# Patient Record
Sex: FEMALE | Race: BLACK OR AFRICAN AMERICAN | NOT HISPANIC OR LATINO | Employment: FULL TIME | URBAN - METROPOLITAN AREA
[De-identification: names, ages, dates, MRNs, and addresses within clinical notes are randomized per-mention and may not be internally consistent; named-entity substitution may affect disease eponyms.]

---

## 2023-06-19 ENCOUNTER — LAB (OUTPATIENT)
Dept: LAB | Facility: LAB | Age: 38
End: 2023-06-19
Payer: COMMERCIAL

## 2023-06-19 ENCOUNTER — OFFICE VISIT (OUTPATIENT)
Dept: PRIMARY CARE | Facility: CLINIC | Age: 38
End: 2023-06-19
Payer: COMMERCIAL

## 2023-06-19 VITALS
WEIGHT: 293 LBS | TEMPERATURE: 97.8 F | SYSTOLIC BLOOD PRESSURE: 138 MMHG | BODY MASS INDEX: 45.31 KG/M2 | HEART RATE: 74 BPM | DIASTOLIC BLOOD PRESSURE: 85 MMHG

## 2023-06-19 DIAGNOSIS — R73.09 ELEVATED HEMOGLOBIN A1C: ICD-10-CM

## 2023-06-19 DIAGNOSIS — M25.50 HYPERMOBILITY ARTHRALGIA: Primary | ICD-10-CM

## 2023-06-19 DIAGNOSIS — M25.50 HYPERMOBILITY ARTHRALGIA: ICD-10-CM

## 2023-06-19 LAB
ALANINE AMINOTRANSFERASE (SGPT) (U/L) IN SER/PLAS: 17 U/L (ref 7–45)
ALBUMIN (G/DL) IN SER/PLAS: 3.5 G/DL (ref 3.4–5)
ALKALINE PHOSPHATASE (U/L) IN SER/PLAS: 79 U/L (ref 33–110)
ANION GAP IN SER/PLAS: 9 MMOL/L (ref 10–20)
ASPARTATE AMINOTRANSFERASE (SGOT) (U/L) IN SER/PLAS: 25 U/L (ref 9–39)
BILIRUBIN TOTAL (MG/DL) IN SER/PLAS: 0.4 MG/DL (ref 0–1.2)
C REACTIVE PROTEIN (MG/L) IN SER/PLAS: 1.38 MG/DL
CALCIUM (MG/DL) IN SER/PLAS: 8.6 MG/DL (ref 8.6–10.3)
CARBON DIOXIDE, TOTAL (MMOL/L) IN SER/PLAS: 30 MMOL/L (ref 21–32)
CHLORIDE (MMOL/L) IN SER/PLAS: 102 MMOL/L (ref 98–107)
CREATININE (MG/DL) IN SER/PLAS: 0.82 MG/DL (ref 0.5–1.05)
ESTIMATED AVERAGE GLUCOSE FOR HBA1C: 117 MG/DL
GFR FEMALE: >90 ML/MIN/1.73M2
GLUCOSE (MG/DL) IN SER/PLAS: 93 MG/DL (ref 74–99)
HEMOGLOBIN A1C/HEMOGLOBIN TOTAL IN BLOOD: 5.7 %
POTASSIUM (MMOL/L) IN SER/PLAS: 4.7 MMOL/L (ref 3.5–5.3)
PROTEIN TOTAL: 7.2 G/DL (ref 6.4–8.2)
RHEUMATOID FACTOR (IU/ML) IN SERUM OR PLASMA: <10 IU/ML (ref 0–15)
SEDIMENTATION RATE, ERYTHROCYTE: 68 MM/H (ref 0–20)
SODIUM (MMOL/L) IN SER/PLAS: 136 MMOL/L (ref 136–145)
URATE (MG/DL) IN SER/PLAS: 4.4 MG/DL (ref 2.3–6.7)
UREA NITROGEN (MG/DL) IN SER/PLAS: 10 MG/DL (ref 6–23)

## 2023-06-19 PROCEDURE — 80053 COMPREHEN METABOLIC PANEL: CPT

## 2023-06-19 PROCEDURE — 86140 C-REACTIVE PROTEIN: CPT

## 2023-06-19 PROCEDURE — 1036F TOBACCO NON-USER: CPT | Performed by: FAMILY MEDICINE

## 2023-06-19 PROCEDURE — 99214 OFFICE O/P EST MOD 30 MIN: CPT | Performed by: FAMILY MEDICINE

## 2023-06-19 PROCEDURE — 86038 ANTINUCLEAR ANTIBODIES: CPT

## 2023-06-19 PROCEDURE — 84550 ASSAY OF BLOOD/URIC ACID: CPT

## 2023-06-19 PROCEDURE — 85652 RBC SED RATE AUTOMATED: CPT

## 2023-06-19 PROCEDURE — 36415 COLL VENOUS BLD VENIPUNCTURE: CPT

## 2023-06-19 PROCEDURE — 83036 HEMOGLOBIN GLYCOSYLATED A1C: CPT

## 2023-06-19 PROCEDURE — 86431 RHEUMATOID FACTOR QUANT: CPT

## 2023-06-19 RX ORDER — IBUPROFEN 600 MG/1
TABLET ORAL 3 TIMES DAILY PRN
COMMUNITY
Start: 2015-09-10

## 2023-06-19 RX ORDER — PROPRANOLOL HYDROCHLORIDE 20 MG/1
1 TABLET ORAL 3 TIMES DAILY
COMMUNITY
Start: 2022-09-07 | End: 2024-02-05 | Stop reason: SDUPTHER

## 2023-06-19 RX ORDER — RIMEGEPANT SULFATE 75 MG/75MG
TABLET, ORALLY DISINTEGRATING ORAL
COMMUNITY
Start: 2021-11-10 | End: 2023-11-16 | Stop reason: SDUPTHER

## 2023-06-19 RX ORDER — DULOXETIN HYDROCHLORIDE 30 MG/1
30 CAPSULE, DELAYED RELEASE ORAL DAILY
COMMUNITY
End: 2024-03-14 | Stop reason: WASHOUT

## 2023-06-19 RX ORDER — AMITRIPTYLINE HYDROCHLORIDE 10 MG/1
10 TABLET, FILM COATED ORAL NIGHTLY
COMMUNITY
End: 2024-02-12

## 2023-06-19 RX ORDER — AZELASTINE 1 MG/ML
SPRAY, METERED NASAL
COMMUNITY

## 2023-06-19 RX ORDER — GABAPENTIN 600 MG/1
600 TABLET ORAL NIGHTLY
COMMUNITY
End: 2023-11-16 | Stop reason: WASHOUT

## 2023-06-19 RX ORDER — ACETAMINOPHEN 500 MG
TABLET ORAL
COMMUNITY

## 2023-06-19 RX ORDER — TRIAMCINOLONE ACETONIDE 1 MG/G
1 OINTMENT TOPICAL 2 TIMES DAILY
COMMUNITY
Start: 2022-12-02

## 2023-06-19 RX ORDER — FERROUS SULFATE 325(65) MG
1 TABLET ORAL 2 TIMES DAILY
COMMUNITY
Start: 2018-01-06

## 2023-06-19 ASSESSMENT — PAIN SCALES - GENERAL: PAINLEVEL: 4

## 2023-06-19 NOTE — PROGRESS NOTES
Subjective   Patient ID: Miguelina Gloria is a 37 y.o. adult who presents for 6 month follow up.  HPI    The patient is here for:  1- her follow-up visit for her elevated A1C.  2-  Left Shoulder pain that started after leaning on her elbows one day and feeling something in her shoulder rolling forward.  Since then she has experienced some pain that never improved.  She was prescribed a medrol dose pack and cyclobenzaprine but they have not helped.   3-  She also has bilateral knees, ankles and wrist hypermotility.     A review of system was completed.  All systems were reviewed and were normal, except for the ones that are listed in the HPI.    Objective   Physical Exam  Constitutional:       Appearance: Normal appearance.   HENT:      Head: Normocephalic and atraumatic.      Right Ear: Tympanic membrane, ear canal and external ear normal.      Left Ear: Tympanic membrane, ear canal and external ear normal.      Nose: Nose normal.      Mouth/Throat:      Mouth: Mucous membranes are moist.      Pharynx: Oropharynx is clear.   Eyes:      Extraocular Movements: Extraocular movements intact.      Conjunctiva/sclera: Conjunctivae normal.      Pupils: Pupils are equal, round, and reactive to light.   Cardiovascular:      Rate and Rhythm: Normal rate and regular rhythm.      Pulses: Normal pulses.   Pulmonary:      Effort: Pulmonary effort is normal.      Breath sounds: Normal breath sounds.   Abdominal:      General: Abdomen is flat. Bowel sounds are normal.      Palpations: Abdomen is soft.   Musculoskeletal:         General: Normal range of motion.      Cervical back: Normal range of motion and neck supple.      Comments: Limited abduction and anterior or posterior mobilization of the left shoulder.  Associated mil-moderate pain with internal or external rotation.   Skin:     General: Skin is warm.   Neurological:      General: No focal deficit present.      Mental Status: Miguelina Gloria is alert and oriented to person,  place, and time. Mental status is at baseline.   Psychiatric:         Mood and Affect: Mood normal.         Behavior: Behavior normal.         Thought Content: Thought content normal.         Judgment: Judgment normal.         Assessment/Plan   Problem List Items Addressed This Visit          Musculoskeletal    Hypermobility arthralgia - Primary    Relevant Orders    Referral to Rheumatology    Uric Acid    C-Reactive Protein    Sedimentation Rate    BRANDIE with Reflex to [facility]    Rheumatoid Factor    Comprehensive Metabolic Panel       Hematologic    Elevated hemoglobin A1c    Relevant Orders    Hemoglobin A1C

## 2023-06-20 LAB — ANTI-NUCLEAR ANTIBODY (ANA): NEGATIVE

## 2023-10-15 PROBLEM — L50.0 ALLERGIC URTICARIA: Status: ACTIVE | Noted: 2023-10-15

## 2023-10-15 PROBLEM — K58.9 IRRITABLE BOWEL SYNDROME WITHOUT DIARRHEA: Status: ACTIVE | Noted: 2023-10-15

## 2023-10-15 PROBLEM — N92.0 SPOTTING BETWEEN MENSES: Status: ACTIVE | Noted: 2023-10-15

## 2023-10-15 PROBLEM — R60.0 BILATERAL LEG EDEMA: Status: ACTIVE | Noted: 2023-10-15

## 2023-10-15 PROBLEM — R09.A2 SENSATION OF LUMP IN THROAT: Status: ACTIVE | Noted: 2023-10-15

## 2023-10-15 PROBLEM — E61.1 IRON DEFICIENCY: Status: ACTIVE | Noted: 2022-09-26

## 2023-10-15 PROBLEM — R13.19 ESOPHAGEAL DYSPHAGIA: Status: ACTIVE | Noted: 2023-10-15

## 2023-10-15 PROBLEM — D24.1 FIBROADENOMA OF RIGHT BREAST: Status: ACTIVE | Noted: 2023-10-15

## 2023-10-15 PROBLEM — R63.5 ABNORMAL WEIGHT GAIN: Status: ACTIVE | Noted: 2023-10-15

## 2023-10-15 PROBLEM — G89.29 CHRONIC PAIN OF MULTIPLE SITES: Status: ACTIVE | Noted: 2023-10-15

## 2023-10-15 PROBLEM — R10.9 ABDOMINAL CRAMPING: Status: ACTIVE | Noted: 2023-10-15

## 2023-10-15 PROBLEM — G43.109 OCULAR MIGRAINE: Status: ACTIVE | Noted: 2023-10-15

## 2023-10-15 PROBLEM — S13.9XXA CERVICAL SPRAIN: Status: ACTIVE | Noted: 2023-10-15

## 2023-10-15 PROBLEM — G43.909 MIGRAINE HEADACHE: Status: ACTIVE | Noted: 2022-09-26

## 2023-10-15 PROBLEM — M25.50 ARTHRALGIA: Status: ACTIVE | Noted: 2022-09-26

## 2023-10-15 PROBLEM — R21 RASH: Status: ACTIVE | Noted: 2023-10-15

## 2023-10-15 PROBLEM — R42 DIZZINESS: Status: ACTIVE | Noted: 2023-10-15

## 2023-10-15 PROBLEM — N76.1 CHRONIC VAGINITIS: Status: ACTIVE | Noted: 2023-10-15

## 2023-10-15 PROBLEM — N89.8 VAGINAL DISCHARGE: Status: ACTIVE | Noted: 2023-10-15

## 2023-10-15 PROBLEM — B37.31 CANDIDIASIS OF VAGINA: Status: ACTIVE | Noted: 2023-10-15

## 2023-10-15 PROBLEM — H93.8X3 SENSATION OF FULLNESS IN BOTH EARS: Status: ACTIVE | Noted: 2023-10-15

## 2023-10-15 PROBLEM — H92.03 ACUTE OTALGIA, BILATERAL: Status: ACTIVE | Noted: 2023-10-15

## 2023-10-15 PROBLEM — R09.A2 GLOBUS SENSATION: Status: ACTIVE | Noted: 2023-10-15

## 2023-10-15 PROBLEM — B49 MYCOSIS: Status: ACTIVE | Noted: 2023-10-15

## 2023-10-15 PROBLEM — M25.562 BILATERAL KNEE PAIN: Status: ACTIVE | Noted: 2023-10-15

## 2023-10-15 PROBLEM — H69.93 DYSFUNCTION OF BOTH EUSTACHIAN TUBES: Status: ACTIVE | Noted: 2023-10-15

## 2023-10-15 PROBLEM — G44.86 CERVICOGENIC HEADACHE: Status: ACTIVE | Noted: 2023-10-15

## 2023-10-15 PROBLEM — H90.12 CONDUCTIVE HEARING LOSS OF LEFT EAR WITH UNRESTRICTED HEARING OF RIGHT EAR: Status: ACTIVE | Noted: 2023-10-15

## 2023-10-15 PROBLEM — R09.81 NASAL CONGESTION: Status: ACTIVE | Noted: 2023-10-15

## 2023-10-15 PROBLEM — K63.8219 SMALL INTESTINAL BACTERIAL OVERGROWTH: Status: ACTIVE | Noted: 2023-10-15

## 2023-10-15 PROBLEM — R00.2 HEART PALPITATIONS: Status: ACTIVE | Noted: 2023-10-15

## 2023-10-15 PROBLEM — Z86.59 HISTORY OF POSTTRAUMATIC STRESS DISORDER (PTSD): Status: ACTIVE | Noted: 2023-10-15

## 2023-10-15 PROBLEM — R51.9 FACIAL PAIN: Status: ACTIVE | Noted: 2023-10-15

## 2023-10-15 PROBLEM — R26.9 ABNORMALITY OF GAIT: Status: ACTIVE | Noted: 2023-10-15

## 2023-10-15 PROBLEM — M35.3 POLYMYALGIA (MULTI): Status: ACTIVE | Noted: 2023-10-15

## 2023-10-15 PROBLEM — N92.6 IRREGULAR BLEEDING: Status: ACTIVE | Noted: 2023-10-15

## 2023-10-15 PROBLEM — D64.9 ANEMIA: Status: ACTIVE | Noted: 2022-09-26

## 2023-10-15 PROBLEM — I45.9 CONDUCTION DISORDER OF THE HEART: Status: ACTIVE | Noted: 2022-09-26

## 2023-10-15 PROBLEM — R10.84 CHRONIC GENERALIZED ABDOMINAL PAIN: Status: ACTIVE | Noted: 2022-09-26

## 2023-10-15 PROBLEM — R10.9 CHRONIC ABDOMINAL PAIN: Status: ACTIVE | Noted: 2023-10-15

## 2023-10-15 PROBLEM — K29.00 GASTRITIS, ACUTE: Status: ACTIVE | Noted: 2023-10-15

## 2023-10-15 PROBLEM — N63.20 UNSPECIFIED LUMP IN THE LEFT BREAST, UNSPECIFIED QUADRANT: Status: ACTIVE | Noted: 2023-10-15

## 2023-10-15 PROBLEM — R52 CHRONIC PAIN OF MULTIPLE SITES: Status: ACTIVE | Noted: 2023-10-15

## 2023-10-15 PROBLEM — R13.10 DYSPHAGIA: Status: ACTIVE | Noted: 2023-10-15

## 2023-10-15 PROBLEM — G89.29 CHRONIC ABDOMINAL PAIN: Status: ACTIVE | Noted: 2023-10-15

## 2023-10-15 PROBLEM — R18.8 INTRA-ABDOMINAL FLUID COLLECTION: Status: ACTIVE | Noted: 2023-10-15

## 2023-10-15 PROBLEM — N94.9 GENITAL LESION, FEMALE: Status: ACTIVE | Noted: 2023-10-15

## 2023-10-15 PROBLEM — M35.7 HYPERMOBILITY SYNDROME: Status: ACTIVE | Noted: 2023-10-15

## 2023-10-15 PROBLEM — G90.A POTS (POSTURAL ORTHOSTATIC TACHYCARDIA SYNDROME): Status: ACTIVE | Noted: 2023-10-15

## 2023-10-15 PROBLEM — G89.29 CHRONIC GENERALIZED ABDOMINAL PAIN: Status: ACTIVE | Noted: 2023-10-15

## 2023-10-15 PROBLEM — M79.7 FIBROMYALGIA: Status: ACTIVE | Noted: 2023-10-15

## 2023-10-15 PROBLEM — K59.09 CHRONIC CONSTIPATION: Status: ACTIVE | Noted: 2023-10-15

## 2023-10-15 PROBLEM — R29.898 DECREASED ROM OF NECK: Status: ACTIVE | Noted: 2023-10-15

## 2023-10-15 PROBLEM — H92.09 PAIN OF EAR STRUCTURE: Status: ACTIVE | Noted: 2023-10-15

## 2023-10-15 PROBLEM — K76.9 LIVER LESION: Status: ACTIVE | Noted: 2023-10-15

## 2023-10-15 PROBLEM — R45.89 DEPRESSED MOOD: Status: ACTIVE | Noted: 2023-10-15

## 2023-10-15 PROBLEM — R92.8 ABNORMAL MAMMOGRAM: Status: ACTIVE | Noted: 2023-10-15

## 2023-10-15 PROBLEM — J31.0 CHRONIC RHINITIS: Status: ACTIVE | Noted: 2023-10-15

## 2023-10-15 PROBLEM — N92.3 SPOTTING BETWEEN MENSES: Status: ACTIVE | Noted: 2023-10-15

## 2023-10-15 PROBLEM — B96.89 BACTERIAL VAGINOSIS: Status: ACTIVE | Noted: 2023-10-15

## 2023-10-15 PROBLEM — R21 SKIN RASH: Status: ACTIVE | Noted: 2023-10-15

## 2023-10-15 PROBLEM — N76.0 BACTERIAL VAGINOSIS: Status: ACTIVE | Noted: 2023-10-15

## 2023-10-15 PROBLEM — D50.9 IRON DEFICIENCY ANEMIA: Status: ACTIVE | Noted: 2023-10-15

## 2023-10-15 PROBLEM — M25.50 POLYARTHRALGIA: Status: ACTIVE | Noted: 2023-10-15

## 2023-10-15 PROBLEM — R14.0 ABDOMINAL BLOATING: Status: ACTIVE | Noted: 2023-10-15

## 2023-10-15 PROBLEM — R10.2 PELVIC PAIN IN FEMALE: Status: ACTIVE | Noted: 2023-10-15

## 2023-10-15 PROBLEM — N92.3 VAGINAL BLEEDING BETWEEN PERIODS: Status: ACTIVE | Noted: 2023-10-15

## 2023-10-15 PROBLEM — N89.8 VAGINAL IRRITATION: Status: ACTIVE | Noted: 2023-10-15

## 2023-10-15 PROBLEM — K13.0 ANGULAR CHEILITIS: Status: ACTIVE | Noted: 2023-10-15

## 2023-10-15 PROBLEM — M54.2 CERVICAL PAIN: Status: ACTIVE | Noted: 2023-10-15

## 2023-10-15 PROBLEM — Z91.49 HISTORY OF PSYCHOLOGICAL TRAUMA: Status: ACTIVE | Noted: 2023-10-15

## 2023-10-15 PROBLEM — G43.119 INTRACTABLE MIGRAINE WITH AURA WITHOUT STATUS MIGRAINOSUS: Status: ACTIVE | Noted: 2023-10-15

## 2023-10-15 PROBLEM — R55 SYNCOPE: Status: ACTIVE | Noted: 2023-10-15

## 2023-10-15 PROBLEM — R10.84 CHRONIC GENERALIZED ABDOMINAL PAIN: Status: ACTIVE | Noted: 2023-10-15

## 2023-10-15 PROBLEM — B37.31 VAGINAL YEAST INFECTION: Status: ACTIVE | Noted: 2023-10-15

## 2023-10-15 PROBLEM — G89.29 CHRONIC GENERALIZED ABDOMINAL PAIN: Status: ACTIVE | Noted: 2022-09-26

## 2023-10-15 PROBLEM — M25.561 BILATERAL KNEE PAIN: Status: ACTIVE | Noted: 2023-10-15

## 2023-10-15 PROBLEM — B37.9 YEAST INFECTION: Status: ACTIVE | Noted: 2023-10-15

## 2023-10-15 PROBLEM — L23.89 ALLERGIC CONTACT DERMATITIS DUE TO OTHER AGENTS: Status: ACTIVE | Noted: 2023-10-15

## 2023-10-15 RX ORDER — SELENIUM 50 MCG
1 TABLET ORAL DAILY
COMMUNITY

## 2023-10-15 RX ORDER — TRIAMCINOLONE ACETONIDE 55 UG/1
SPRAY, METERED NASAL
COMMUNITY
Start: 2021-03-22

## 2023-10-15 RX ORDER — FEXOFENADINE HCL AND PSEUDOEPHEDRINE HCI 180; 240 MG/1; MG/1
1 TABLET, EXTENDED RELEASE ORAL DAILY
COMMUNITY

## 2023-10-15 RX ORDER — KETOROLAC TROMETHAMINE 10 MG/1
10 TABLET, FILM COATED ORAL EVERY 6 HOURS
COMMUNITY
Start: 2023-02-03 | End: 2024-05-09 | Stop reason: ALTCHOICE

## 2023-10-15 RX ORDER — TOPIRAMATE 25 MG/1
25 TABLET ORAL EVERY MORNING
COMMUNITY
End: 2023-11-16 | Stop reason: WASHOUT

## 2023-10-15 RX ORDER — MAGNESIUM 250 MG
TABLET ORAL
COMMUNITY
Start: 2023-04-19

## 2023-10-15 RX ORDER — GABAPENTIN 100 MG/1
100 CAPSULE ORAL 3 TIMES DAILY
COMMUNITY
Start: 2022-11-11 | End: 2023-11-16 | Stop reason: WASHOUT

## 2023-10-15 RX ORDER — LINACLOTIDE 145 UG/1
145 CAPSULE, GELATIN COATED ORAL DAILY
COMMUNITY

## 2023-10-15 RX ORDER — ASCORBIC ACID, TOCOPHERYL ACID SUCCINATE, THIAMINE, RIBOFLAVIN, NIACINAMIDE, PYRIDOXINE, FOLIC ACID, COBALAMIN, BIOTIN, PANTOTHENIC ACID, ZINC, SELENIUM 100; 1.5; 1.7; 20; 50; 5; 2; 300; 10; 25; 30; 7 MG/1; MG/1; MG/1; MG/1; MG/1; MG/1; MG/1; UG/1; MG/1; MG/1; [IU]/1; UG/1
TABLET, COATED ORAL DAILY
COMMUNITY

## 2023-10-15 RX ORDER — FAMOTIDINE 20 MG/1
1 TABLET, FILM COATED ORAL 2 TIMES DAILY PRN
COMMUNITY
Start: 2023-02-10

## 2023-10-15 RX ORDER — DULOXETIN HYDROCHLORIDE 30 MG/1
60 CAPSULE, DELAYED RELEASE ORAL DAILY
COMMUNITY
Start: 2023-07-01 | End: 2024-05-02 | Stop reason: SDUPTHER

## 2023-10-15 RX ORDER — L.ACID,FERM,PLA,RHA/B.BIF,LONG 126 MG
1 TABLET, DELAYED AND EXTENDED RELEASE ORAL DAILY
COMMUNITY
Start: 2023-04-19

## 2023-10-17 ENCOUNTER — APPOINTMENT (OUTPATIENT)
Dept: OBSTETRICS AND GYNECOLOGY | Facility: CLINIC | Age: 38
End: 2023-10-17
Payer: COMMERCIAL

## 2023-10-26 ENCOUNTER — PHARMACY VISIT (OUTPATIENT)
Dept: PHARMACY | Facility: CLINIC | Age: 38
End: 2023-10-26
Payer: MEDICARE

## 2023-10-26 ENCOUNTER — SPECIALTY PHARMACY (OUTPATIENT)
Dept: PHARMACY | Facility: CLINIC | Age: 38
End: 2023-10-26

## 2023-11-14 ENCOUNTER — PHARMACY VISIT (OUTPATIENT)
Dept: PHARMACY | Facility: CLINIC | Age: 38
End: 2023-11-14
Payer: MEDICARE

## 2023-11-14 PROCEDURE — RXMED WILLOW AMBULATORY MEDICATION CHARGE

## 2023-11-16 ENCOUNTER — OFFICE VISIT (OUTPATIENT)
Dept: NEUROLOGY | Facility: CLINIC | Age: 38
End: 2023-11-16
Payer: COMMERCIAL

## 2023-11-16 VITALS
HEART RATE: 91 BPM | WEIGHT: 293 LBS | BODY MASS INDEX: 49.05 KG/M2 | DIASTOLIC BLOOD PRESSURE: 83 MMHG | SYSTOLIC BLOOD PRESSURE: 126 MMHG

## 2023-11-16 DIAGNOSIS — G43.119 INTRACTABLE MIGRAINE WITH AURA WITHOUT STATUS MIGRAINOSUS: Primary | ICD-10-CM

## 2023-11-16 DIAGNOSIS — G43.109 OCULAR MIGRAINE: ICD-10-CM

## 2023-11-16 PROCEDURE — 99214 OFFICE O/P EST MOD 30 MIN: CPT | Performed by: NURSE PRACTITIONER

## 2023-11-16 PROCEDURE — 1036F TOBACCO NON-USER: CPT | Performed by: NURSE PRACTITIONER

## 2023-11-16 RX ORDER — GALCANEZUMAB 120 MG/ML
120 INJECTION, SOLUTION SUBCUTANEOUS
Qty: 2 EACH | Refills: 3 | Status: SHIPPED | OUTPATIENT
Start: 2023-11-16 | End: 2024-02-12 | Stop reason: SDUPTHER

## 2023-11-16 ASSESSMENT — LIFESTYLE VARIABLES
HOW OFTEN DO YOU HAVE SIX OR MORE DRINKS ON ONE OCCASION: NEVER
HOW OFTEN DO YOU HAVE A DRINK CONTAINING ALCOHOL: NEVER

## 2023-11-16 ASSESSMENT — PATIENT HEALTH QUESTIONNAIRE - PHQ9
2. FEELING DOWN, DEPRESSED OR HOPELESS: MORE THAN HALF THE DAYS
10. IF YOU CHECKED OFF ANY PROBLEMS, HOW DIFFICULT HAVE THESE PROBLEMS MADE IT FOR YOU TO DO YOUR WORK, TAKE CARE OF THINGS AT HOME, OR GET ALONG WITH OTHER PEOPLE: VERY DIFFICULT
8. MOVING OR SPEAKING SO SLOWLY THAT OTHER PEOPLE COULD HAVE NOTICED. OR THE OPPOSITE, BEING SO FIGETY OR RESTLESS THAT YOU HAVE BEEN MOVING AROUND A LOT MORE THAN USUAL: NOT AT ALL
9. THOUGHTS THAT YOU WOULD BE BETTER OFF DEAD, OR OF HURTING YOURSELF: NOT AT ALL
5. POOR APPETITE OR OVEREATING: MORE THAN HALF THE DAYS
3. TROUBLE FALLING OR STAYING ASLEEP: SEVERAL DAYS
SUM OF ALL RESPONSES TO PHQ9 QUESTIONS 1 & 2: 4
4. FEELING TIRED OR HAVING LITTLE ENERGY: SEVERAL DAYS
SUM OF ALL RESPONSES TO PHQ QUESTIONS 1-9: 11
6. FEELING BAD ABOUT YOURSELF - OR THAT YOU ARE A FAILURE OR HAVE LET YOURSELF OR YOUR FAMILY DOWN: MORE THAN HALF THE DAYS
7. TROUBLE CONCENTRATING ON THINGS, SUCH AS READING THE NEWSPAPER OR WATCHING TELEVISION: SEVERAL DAYS
1. LITTLE INTEREST OR PLEASURE IN DOING THINGS: MORE THAN HALF THE DAYS

## 2023-11-16 ASSESSMENT — ANXIETY QUESTIONNAIRES
3. WORRYING TOO MUCH ABOUT DIFFERENT THINGS: SEVERAL DAYS
4. TROUBLE RELAXING: SEVERAL DAYS
2. NOT BEING ABLE TO STOP OR CONTROL WORRYING: SEVERAL DAYS
7. FEELING AFRAID AS IF SOMETHING AWFUL MIGHT HAPPEN: SEVERAL DAYS
5. BEING SO RESTLESS THAT IT IS HARD TO SIT STILL: NOT AT ALL
1. FEELING NERVOUS, ANXIOUS, OR ON EDGE: MORE THAN HALF THE DAYS
IF YOU CHECKED OFF ANY PROBLEMS ON THIS QUESTIONNAIRE, HOW DIFFICULT HAVE THESE PROBLEMS MADE IT FOR YOU TO DO YOUR WORK, TAKE CARE OF THINGS AT HOME, OR GET ALONG WITH OTHER PEOPLE: SOMEWHAT DIFFICULT
GAD7 TOTAL SCORE: 7
6. BECOMING EASILY ANNOYED OR IRRITABLE: SEVERAL DAYS

## 2023-11-16 ASSESSMENT — ENCOUNTER SYMPTOMS
OCCASIONAL FEELINGS OF UNSTEADINESS: 1
DEPRESSION: 1
LOSS OF SENSATION IN FEET: 0

## 2023-11-17 NOTE — PROGRESS NOTES
Patient being assessed today for follow-up of migraine.  She reports that she is continuing to have 6 on average of 3 migraine days per week.  Discussed different possible options to help with preventative treatment due to the frequency.  Would like to try her on Emgality monthly.  Sample provided as we wait for the insurance process to go through.  Patient has in the past tried and failed beta-blocker, SSRI, topiramate, gabapentin.  Discussed role of medicine,  importance of taking medications, potential risks, benefits, and precautions to be taken.  Reviewed sleep hygiene and dietary modifications.  Follow-up in 2 to 3 months.    This note was created with voice recognition software and was not corrected for typographical or grammatical errors

## 2023-12-16 ENCOUNTER — SPECIALTY PHARMACY (OUTPATIENT)
Dept: PHARMACY | Facility: CLINIC | Age: 38
End: 2023-12-16

## 2023-12-16 PROCEDURE — RXMED WILLOW AMBULATORY MEDICATION CHARGE

## 2023-12-20 ENCOUNTER — PHARMACY VISIT (OUTPATIENT)
Dept: PHARMACY | Facility: CLINIC | Age: 38
End: 2023-12-20
Payer: MEDICARE

## 2024-01-19 ENCOUNTER — SPECIALTY PHARMACY (OUTPATIENT)
Dept: PHARMACY | Facility: CLINIC | Age: 39
End: 2024-01-19

## 2024-01-19 NOTE — TELEPHONE ENCOUNTER
Pt most recently seen by Jessica ryan as new neurology provider. Please send script to her for refill if appropriate.

## 2024-01-22 DIAGNOSIS — M79.7 FIBROMYALGIA: ICD-10-CM

## 2024-01-22 RX ORDER — DULOXETIN HYDROCHLORIDE 30 MG/1
60 CAPSULE, DELAYED RELEASE ORAL DAILY
Qty: 180 CAPSULE | Refills: 3 | Status: SHIPPED | OUTPATIENT
Start: 2024-01-22 | End: 2024-03-14 | Stop reason: WASHOUT

## 2024-01-25 ENCOUNTER — SPECIALTY PHARMACY (OUTPATIENT)
Dept: PHARMACY | Facility: CLINIC | Age: 39
End: 2024-01-25

## 2024-02-05 DIAGNOSIS — G43.119 INTRACTABLE MIGRAINE WITH AURA WITHOUT STATUS MIGRAINOSUS: Primary | ICD-10-CM

## 2024-02-05 RX ORDER — PROPRANOLOL HYDROCHLORIDE 20 MG/1
20 TABLET ORAL 3 TIMES DAILY
Qty: 90 TABLET | Refills: 0 | Status: SHIPPED | OUTPATIENT
Start: 2024-02-05 | End: 2024-02-12 | Stop reason: SDUPTHER

## 2024-02-06 ENCOUNTER — SPECIALTY PHARMACY (OUTPATIENT)
Dept: PHARMACY | Facility: CLINIC | Age: 39
End: 2024-02-06

## 2024-02-06 PROCEDURE — RXMED WILLOW AMBULATORY MEDICATION CHARGE

## 2024-02-07 ENCOUNTER — PHARMACY VISIT (OUTPATIENT)
Dept: PHARMACY | Facility: CLINIC | Age: 39
End: 2024-02-07
Payer: MEDICARE

## 2024-02-08 ENCOUNTER — CLINICAL SUPPORT (OUTPATIENT)
Dept: AUDIOLOGY | Facility: CLINIC | Age: 39
End: 2024-02-08
Payer: COMMERCIAL

## 2024-02-08 ENCOUNTER — OFFICE VISIT (OUTPATIENT)
Dept: OTOLARYNGOLOGY | Facility: CLINIC | Age: 39
End: 2024-02-08
Payer: COMMERCIAL

## 2024-02-08 DIAGNOSIS — H90.12 CONDUCTIVE HEARING LOSS OF LEFT EAR WITH UNRESTRICTED HEARING OF RIGHT EAR: Primary | ICD-10-CM

## 2024-02-08 DIAGNOSIS — H90.72 MIXED CONDUCTIVE AND SENSORINEURAL HEARING LOSS OF LEFT EAR WITH UNRESTRICTED HEARING OF RIGHT EAR: Primary | ICD-10-CM

## 2024-02-08 PROCEDURE — 92557 COMPREHENSIVE HEARING TEST: CPT | Performed by: AUDIOLOGIST

## 2024-02-08 PROCEDURE — 1036F TOBACCO NON-USER: CPT | Performed by: OTOLARYNGOLOGY

## 2024-02-08 PROCEDURE — 99213 OFFICE O/P EST LOW 20 MIN: CPT | Performed by: OTOLARYNGOLOGY

## 2024-02-08 PROCEDURE — 92567 TYMPANOMETRY: CPT | Performed by: AUDIOLOGIST

## 2024-02-08 ASSESSMENT — ENCOUNTER SYMPTOMS: DEPRESSION: 1

## 2024-02-08 NOTE — PROGRESS NOTES
History of present illness:  Miguelina Gloria is a 38 y.o. adult presenting today for annual follow-up. Patient reports difficulty hearing the phone ring with associated crackling sound in the left ear. However this is unchanged from her last visit.    RECALL 09/22/2022  The patient is a 36-year-old female presenting in clinic for a follow-up visit and she is accompanied by someone. She reports to having issues with hearing rumbling noises.     RECALL 05/07/21:  This is the initial visit for this patient who is referred by my esteemed colleague Dr Robbie Chris ([place]) for evaluation of progressive left conductive hearing loss and possible otosclerosis in the left ear. The patient is not accompanied by anyone.   When asked about ear pain, hearing loss, discharge from ear, tinnitus, aural fullness or autophony in the affected ear, the patient admits to progressive left subjective hearing loss, intermittent dizziness with quick head movement.  The patient's better hearing ear subjectively is the right ear.  When asked about a significant past otological history including history of prior ear surgery, noise exposure, exposure to ototoxic drugs or agents, and/or family history of hearing loss, the patient admits to no significant history.    The patient's current medications, active allergies and list of medical problems were reviewed in the EHR and confirmed electronically there are as follows;  Allergies:   Allergies   Allergen Reactions    Bee Pollen Other    Kiwi Unknown    Pineapple Unknown    Silver Nitrate Hives, Itching and Swelling     Current list of medications:   Current Outpatient Medications   Medication Sig Dispense Refill    amitriptyline (Elavil) 10 mg tablet Take 1 tablet (10 mg) by mouth once daily at bedtime.      azelastine (Astelin) 137 mcg (0.1 %) nasal spray USE 1 SRPAY EACH NOSTRIL DAILY AS DIRECTED      calcium carbonate-vitamin D3 600 mg-20 mcg (800 unit) tablet Take by mouth.      calcium  carbonate/vitamin D3 (CALCIUM 600 WITH VITAMIN D3 ORAL) Calcium-Vitamin D 600-3.125 MG-MCG Oral Tablet  Refills: 0  Start: 19-Apr-2023      DULoxetine (Cymbalta) 30 mg DR capsule Take 1 capsule (30 mg) by mouth once daily.      DULoxetine (Cymbalta) 30 mg DR capsule Take 2 capsules (60 mg) by mouth once daily.      DULoxetine (Cymbalta) 30 mg DR capsule TAKE 2 CAPSULES BY MOUTH EVERY  capsule 3    famotidine (Pepcid) 20 mg tablet Take 1 tablet (20 mg) by mouth 2 times a day as needed.      ferrous fumarate/ascorbic acid (FERROUS FUMARATE-VITAMIN C ORAL) Take 1 tablet by mouth once daily.      ferrous sulfate 325 (65 Fe) MG tablet Take 1 tablet (325 mg) by mouth 2 times a day.      fexofenadine-pseudoephedrine (Allegra-D 24 Hour) 180-240 mg 24 hr tablet Take 1 tablet by mouth once daily.      FEXOFENADINE-PSEUDOEPHEDRINE ORAL Take 1 tablet by mouth once daily.      FIBER, CALCIUM POLYCARBOPHIL, ORAL Take 5 g by mouth once daily.      galcanezumab (Emgality Pen) 120 mg/mL auto-injector Inject 1 Syringe (120 mg) under the skin every 28 (twenty-eight) days. Loading dose of 240mg x 1 then 120mg subcutaneous every month thereafter 2 each 3    ibuprofen 600 mg tablet Take by mouth 3 times a day as needed.      ketorolac (Toradol) 10 mg tablet Take 1 tablet (10 mg) by mouth every 6 hours. With food      L.acid,ferm,nichelle,rha-B.bif,long (Controlled Delivery Probiotic) 126 mg (2 billion cell) tablet,delayed and ext.release Take 1 tablet by mouth once daily.      lactobacillus acidophilus capsule Take 1 capsule by mouth once daily.      Linzess 145 mcg capsule Take 1 capsule (145 mcg) by mouth once daily.      magnesium 250 mg tablet Magnesium 250 MG Oral Tablet  Refills: 0  Start: 19-Apr-2023      multivit-minerals/folic acid (CENTRUM ADULTS ORAL) Take 1 tablet by mouth once daily.      multivit-mins no.11-folic acid (Dialyvite 5000) 5 mg tablet Take by mouth once daily.      propranolol (Inderal) 20 mg tablet Take 1  tablet (20 mg) by mouth 3 times a day. 90 tablet 0    rimegepant (Nurtec ODT) 75 mg tablet,disintegrating Alow one (1) tablet to dissolve on or under the tongue once daily as needed for migraine. Do not take more than one (1) tablet in a 24 hour period. 16 tablet 0    triamcinolone (Kenalog) 0.1 % ointment Apply 1 Application topically 2 times a day.      triamcinolone (Nasacort) 55 mcg nasal inhaler Nasal Allergy 24 Hour 55 MCG/ACT Nasal Aerosol  Refills: 0       No current facility-administered medications for this visit.     Problem list:  Patient Active Problem List   Diagnosis    Hypermobility arthralgia    Elevated hemoglobin A1c    Abnormal mammogram    Abnormal weight gain    Abnormality of gait    Allergic contact dermatitis due to other agents    Allergic urticaria    Anemia    Angular cheilitis    Bilateral knee pain    Bilateral leg edema    Bacterial vaginosis    Candidiasis of vagina    Vaginal yeast infection    Cervical sprain    Cervicogenic headache    Abdominal bloating    Abdominal cramping    Acute otalgia, bilateral    Cervical pain    Chronic abdominal pain    Chronic generalized abdominal pain    Chronic pain of multiple sites    Facial pain    Fibromyalgia    Pain of ear structure    Sensation of fullness in both ears    Chronic rhinitis    Chronic vaginitis    Ocular migraine    Intractable migraine with aura without status migrainosus    Intra-abdominal fluid collection    Hypermobility syndrome    History of posttraumatic stress disorder (PTSD)    History of psychological trauma    Heart palpitations    Genital lesion, female    Gastritis, acute    Fibroadenoma of right breast    Dysfunction of both eustachian tubes    Dysphagia    Esophageal dysphagia    Conduction disorder of the heart    Conductive hearing loss of left ear with unrestricted hearing of right ear    Decreased ROM of neck    Depressed mood    Dizziness    Iron deficiency    Iron deficiency anemia    Irregular bleeding     Vaginal bleeding between periods    Spotting between menses    Chronic constipation    Polymyalgia (CMS/HCC)    POTS (postural orthostatic tachycardia syndrome)    Sensation of lump in throat    Globus sensation    Rash    Skin rash    Small intestinal bacterial overgrowth    Syncope    Unspecified lump in the left breast, unspecified quadrant    Vaginal discharge    Vaginal irritation    Mycosis    Yeast infection    Arthralgia    Polyarthralgia    Liver lesion    Irritable bowel syndrome without diarrhea    Migraine headache    Nasal congestion    Pelvic pain in female         Physical Examination:  CONSTITUTIONAL:  No acute distress  VOICE:  No hoarseness or other abnormality  RESPIRATION:  Breathing comfortably, no stridor  CV:  No clubbing/cyanosis/edema in hands  EYES:  EOM intact, sclera clear  NEURO:  Alert and oriented times 3, Cranial nerves II-XII grossly intact and symmetric bilaterally  HEAD AND FACE:  Symmetric facial features, no masses or lesions  RIGHT EAR:  Normal external ear and post auricular area, no visible lesions, external auditory canal patent, tympanic membrane intact, no retraction, no signs of mass, effusion, or infection within the middle ear  LEFT EAR: Normal external ear and post auricular area, no visible lesions, external auditory canal patent, tympanic membrane intact, no retraction, no signs of mass, effusion, or infection within the middle ear  NOSE:  Anterior rhinoscopy clear, no significant external deformity.  ORAL CAVITY/OROPHARYNX/LIPS:  normal lining, mobile tongue.  PHARYNGEAL WALLS: Moist mucosal lining, good palatal elevation  NECK/LYMPH:  No LAD, no thyroid masses, trachea midline  SKIN:  Neck and facial skin is without scar or injury  PSYCH:  Alert and oriented with appropriate mood and affect  Yang Test   Ac > BC on the left ear, 512 and 1024 Hz    Diagnostic testing:    Audiometry:  Audiometry testing done on 02/08/24 reveals:    On the right:normal hearing  sensitivity   On the left: Mild to borderline conductive hearing loss  Bilateral Type A tympanogram. Excellent speech discrimination.    I personally reviewed the available patient's external record and independently reviewed their audiometric testing [and] radiographic imaging through the appropriate viewing software as detailed in my note and agree with the detailed report.      Impression:  Left conductive hearing loss  Possibly left otosclerosis    Recommendation:  Her otologic condition is overall unchanged. She is presently not a surgical candidate. We can consider hearing amplification with hearing  aids. Follow-up in two years.  I am the closest ENT physician to her home.        Scribe Attestation  By signing my name below, IBeverley Scribe   attest that this documentation has been prepared under the direction and in the presence of Teodora Veliz MD.

## 2024-02-08 NOTE — PROGRESS NOTES
"AUDIOMETRIC EVALUATION       Name:  Miguelina Gloria  :  1985  Age:  38 y.o.  Date of Evaluation:  2024     HISTORY  Miguelina Gloria, a 38 year old woman, returns today for audiologic re-evaluation in conjunction with appointment with Teodora Veliz M.D. She has a history of left conductive hearing loss, possibly related to otosclerosis. Reports a decrease in left ear hearing since last evaluation in , she tried to listen to a phone conversation in her left ear and was unable to hear. She notes a intermittent \"thunderstom\"-like tinnitus mostly in the left ear. She experiences some dizziness.     She experiences some ear pain. Her mother has hearing loss. Recent ear infections, ear surgery, and loud noise exposure were denied.     PROCEDURE:  Otoscopic Evaluation:    RIGHT: Clear ear canal and tympanic membrane visualized.  LEFT:  Clear ear canal and tympanic membrane visualized.    Immittance: Tympanometry (226 Hz probe tone) and Stapedial Acoustic Reflexes Thresholds (ART)(Probe ear):  RIGHT: Normal middle ear pressure, mobility, and ear canal volume. Ipsilateral ART present 500-2000 Hz.  LEFT: Normal middle ear pressure, mobility, and ear canal volume. Ipsilateral ART absent 500-2000 Hz.    Pure Tone and Speech Audiometry:    Test Technique: Pure Tone Audiometry via insert earphones  Test Reliability: good    RIGHT: Normal hearing sensitivity 250-8000 Hz. Word Recognition score was excellent using recorded material (NU-6 10-word list ordered by difficulty).   LEFT:  Moderate mixed hearing loss rising to within normal limits. Word Recognition score was excellent using recorded material (NU-6 10-word list ordered by difficulty).     EVALUATION  See scanned Audiogram in \"Media\".    IMPRESSIONS:  Today's test results indicate stable hearing as compared to previous testing.  Normal hearing right ear, moderate mixed rising to within normal limits left ear. Previous hearing test showed conductive hearing " loss, today shows more sensorineural component at 500 Hz than in previous test (2022). Consider amplification if perception of hearing loss impacts daily life.    RECOMMENDATIONS:  Continue medical follow-up with physician.  Return for audiologic assessment in conjunction with otologic care or annually.   Consider amplification pending medical clearance. Check insurance benefit for hearing aid benefits and in-network providers. To schedule an appointment for a hearing aid consultation call [phone].  Communication strategies (utilizing visual cues/gestures; reducing background noise and distance from desired source; increasing light to assist with visual cues; use of clear speech).     PATIENT EDUCATION:   Discussed results and recommendations with Miguelina Gloria.  Questions were addressed and the patient was encouraged to contact our department ([phone]) should concerns arise.    SERGIO Wilson, CCC-A  Senior Clinical Audiologist    TIME: 2382-7508

## 2024-02-12 ENCOUNTER — TELEMEDICINE (OUTPATIENT)
Dept: NEUROLOGY | Facility: CLINIC | Age: 39
End: 2024-02-12
Payer: COMMERCIAL

## 2024-02-12 DIAGNOSIS — G43.119 INTRACTABLE MIGRAINE WITH AURA WITHOUT STATUS MIGRAINOSUS: Primary | ICD-10-CM

## 2024-02-12 DIAGNOSIS — G44.86 CERVICOGENIC HEADACHE: ICD-10-CM

## 2024-02-12 DIAGNOSIS — R45.89 OTHER SYMPTOMS AND SIGNS INVOLVING EMOTIONAL STATE: ICD-10-CM

## 2024-02-12 PROCEDURE — 99214 OFFICE O/P EST MOD 30 MIN: CPT | Performed by: NURSE PRACTITIONER

## 2024-02-12 PROCEDURE — 1036F TOBACCO NON-USER: CPT | Performed by: NURSE PRACTITIONER

## 2024-02-12 RX ORDER — AMITRIPTYLINE HYDROCHLORIDE 10 MG/1
10 TABLET, FILM COATED ORAL NIGHTLY
Qty: 90 TABLET | Refills: 2 | Status: SHIPPED | OUTPATIENT
Start: 2024-02-12

## 2024-02-12 RX ORDER — PROPRANOLOL HYDROCHLORIDE 20 MG/1
20 TABLET ORAL 3 TIMES DAILY
Qty: 90 TABLET | Refills: 5 | Status: SHIPPED | OUTPATIENT
Start: 2024-02-12

## 2024-02-12 RX ORDER — GALCANEZUMAB 120 MG/ML
120 INJECTION, SOLUTION SUBCUTANEOUS
Qty: 1 EACH | Refills: 5 | Status: SHIPPED | OUTPATIENT
Start: 2024-02-12

## 2024-03-08 ENCOUNTER — SPECIALTY PHARMACY (OUTPATIENT)
Dept: PHARMACY | Facility: CLINIC | Age: 39
End: 2024-03-08

## 2024-03-11 ENCOUNTER — APPOINTMENT (OUTPATIENT)
Dept: OTOLARYNGOLOGY | Facility: CLINIC | Age: 39
End: 2024-03-11
Payer: COMMERCIAL

## 2024-03-11 DIAGNOSIS — G43.119 INTRACTABLE MIGRAINE WITH AURA WITHOUT STATUS MIGRAINOSUS: ICD-10-CM

## 2024-03-12 PROCEDURE — RXMED WILLOW AMBULATORY MEDICATION CHARGE

## 2024-03-14 ENCOUNTER — OFFICE VISIT (OUTPATIENT)
Dept: PRIMARY CARE | Facility: CLINIC | Age: 39
End: 2024-03-14
Payer: COMMERCIAL

## 2024-03-14 ENCOUNTER — HOSPITAL ENCOUNTER (OUTPATIENT)
Dept: RADIOLOGY | Facility: CLINIC | Age: 39
Discharge: HOME | End: 2024-03-14
Payer: COMMERCIAL

## 2024-03-14 VITALS
DIASTOLIC BLOOD PRESSURE: 82 MMHG | SYSTOLIC BLOOD PRESSURE: 131 MMHG | WEIGHT: 293 LBS | HEART RATE: 96 BPM | TEMPERATURE: 98 F | BODY MASS INDEX: 51.7 KG/M2

## 2024-03-14 DIAGNOSIS — M54.9 UPPER BACK PAIN: ICD-10-CM

## 2024-03-14 DIAGNOSIS — M25.551 BILATERAL HIP PAIN: ICD-10-CM

## 2024-03-14 DIAGNOSIS — M54.50 MIDLINE LOW BACK PAIN, UNSPECIFIED CHRONICITY, UNSPECIFIED WHETHER SCIATICA PRESENT: ICD-10-CM

## 2024-03-14 DIAGNOSIS — M25.552 BILATERAL HIP PAIN: ICD-10-CM

## 2024-03-14 DIAGNOSIS — M54.9 UPPER BACK PAIN: Primary | ICD-10-CM

## 2024-03-14 PROCEDURE — 72110 X-RAY EXAM L-2 SPINE 4/>VWS: CPT

## 2024-03-14 PROCEDURE — 72070 X-RAY EXAM THORAC SPINE 2VWS: CPT

## 2024-03-14 PROCEDURE — 99214 OFFICE O/P EST MOD 30 MIN: CPT | Performed by: FAMILY MEDICINE

## 2024-03-14 PROCEDURE — 72110 X-RAY EXAM L-2 SPINE 4/>VWS: CPT | Performed by: RADIOLOGY

## 2024-03-14 PROCEDURE — 73522 X-RAY EXAM HIPS BI 3-4 VIEWS: CPT

## 2024-03-14 PROCEDURE — 73521 X-RAY EXAM HIPS BI 2 VIEWS: CPT | Mod: BILATERAL PROCEDURE | Performed by: RADIOLOGY

## 2024-03-14 PROCEDURE — 1036F TOBACCO NON-USER: CPT | Performed by: FAMILY MEDICINE

## 2024-03-14 RX ORDER — METHYLPREDNISOLONE 4 MG/1
TABLET ORAL
Qty: 21 TABLET | Refills: 0 | Status: SHIPPED | OUTPATIENT
Start: 2024-03-14 | End: 2024-03-21

## 2024-03-14 ASSESSMENT — ENCOUNTER SYMPTOMS: BACK PAIN: 1

## 2024-03-14 NOTE — PROGRESS NOTES
Subjective   Patient ID: Migueilna Gloria is a 38 y.o. adult who presents for Back Pain.  Back Pain    The patient is a 37 yo AA female with a history of obesity- BMI 51, ocular migraines, POTS, here for the management of a back pain that occurred after an accidental fall in November of 2023.  The patient states that she slipped in her bathtub and landed back moeller of her hips and her upper and lower back.  Since then, she has developed a pain that has worsen since then.  It is not responding to Tylenol.  NSAIDS cause heartburn.     A review of system was completed.  All systems were reviewed and were normal, except for the ones that are listed in the HPI.    Objective   Physical Exam  Constitutional:       Appearance: Normal appearance.   HENT:      Head: Normocephalic and atraumatic.      Right Ear: Tympanic membrane, ear canal and external ear normal.      Left Ear: Tympanic membrane, ear canal and external ear normal.      Nose: Nose normal.      Mouth/Throat:      Mouth: Mucous membranes are moist.      Pharynx: Oropharynx is clear.   Eyes:      Extraocular Movements: Extraocular movements intact.      Conjunctiva/sclera: Conjunctivae normal.      Pupils: Pupils are equal, round, and reactive to light.   Cardiovascular:      Rate and Rhythm: Normal rate and regular rhythm.      Pulses: Normal pulses.   Pulmonary:      Effort: Pulmonary effort is normal.      Breath sounds: Normal breath sounds.   Abdominal:      General: Abdomen is flat. Bowel sounds are normal.      Palpations: Abdomen is soft.   Musculoskeletal:         General: Normal range of motion.      Cervical back: Normal range of motion and neck supple.   Skin:     General: Skin is warm.   Neurological:      General: No focal deficit present.      Mental Status: Miguelina Gloria is alert and oriented to person, place, and time. Mental status is at baseline.   Psychiatric:         Mood and Affect: Mood normal.         Behavior: Behavior normal.          Thought Content: Thought content normal.         Judgment: Judgment normal.     Assessment/Plan   Problem List Items Addressed This Visit       Upper back pain - Primary     - Medrol dosepack started.   - Then  Tylenol.  NSAIDS cause heartburn.   -PT referral done.            Relevant Medications    methylPREDNISolone (Medrol Dospak) 4 mg tablets    Other Relevant Orders    XR thoracic spine 2 views    XR hips bilateral 3 or 4 VW w pelvis when performed    Referral to Physical Therapy    Midline low back pain     - Medrol dosepack started.   - Then  Tylenol.  NSAIDS cause heartburn.   -PT referral done.            Relevant Medications    methylPREDNISolone (Medrol Dospak) 4 mg tablets    Other Relevant Orders    XR lumbar spine 2-3 views    XR hips bilateral 3 or 4 VW w pelvis when performed    Referral to Physical Therapy     Other Visit Diagnoses       Bilateral hip pain        Relevant Orders    XR hips bilateral 3 or 4 VW w pelvis when performed    Referral to Physical Therapy         Patient to return to office in 2- 4 weeks.

## 2024-03-22 ENCOUNTER — SPECIALTY PHARMACY (OUTPATIENT)
Dept: PHARMACY | Facility: CLINIC | Age: 39
End: 2024-03-22

## 2024-03-25 ENCOUNTER — SPECIALTY PHARMACY (OUTPATIENT)
Dept: PHARMACY | Facility: CLINIC | Age: 39
End: 2024-03-25

## 2024-03-27 ENCOUNTER — PHARMACY VISIT (OUTPATIENT)
Dept: PHARMACY | Facility: CLINIC | Age: 39
End: 2024-03-27
Payer: MEDICARE

## 2024-04-02 ENCOUNTER — EVALUATION (OUTPATIENT)
Dept: PHYSICAL THERAPY | Facility: CLINIC | Age: 39
End: 2024-04-02
Payer: COMMERCIAL

## 2024-04-02 DIAGNOSIS — M54.50 MIDLINE LOW BACK PAIN, UNSPECIFIED CHRONICITY, UNSPECIFIED WHETHER SCIATICA PRESENT: ICD-10-CM

## 2024-04-02 DIAGNOSIS — M54.9 UPPER BACK PAIN: Primary | ICD-10-CM

## 2024-04-02 DIAGNOSIS — M25.551 BILATERAL HIP PAIN: ICD-10-CM

## 2024-04-02 DIAGNOSIS — M25.552 BILATERAL HIP PAIN: ICD-10-CM

## 2024-04-02 PROCEDURE — 97161 PT EVAL LOW COMPLEX 20 MIN: CPT | Mod: GP | Performed by: PHYSICAL THERAPIST

## 2024-04-02 PROCEDURE — 97110 THERAPEUTIC EXERCISES: CPT | Mod: GP | Performed by: PHYSICAL THERAPIST

## 2024-04-02 ASSESSMENT — PAIN - FUNCTIONAL ASSESSMENT: PAIN_FUNCTIONAL_ASSESSMENT: 0-10

## 2024-04-02 ASSESSMENT — PATIENT HEALTH QUESTIONNAIRE - PHQ9
SUM OF ALL RESPONSES TO PHQ9 QUESTIONS 1 AND 2: 4
1. LITTLE INTEREST OR PLEASURE IN DOING THINGS: MORE THAN HALF THE DAYS
2. FEELING DOWN, DEPRESSED OR HOPELESS: MORE THAN HALF THE DAYS

## 2024-04-02 ASSESSMENT — ENCOUNTER SYMPTOMS
OCCASIONAL FEELINGS OF UNSTEADINESS: 1
LOSS OF SENSATION IN FEET: 0
DEPRESSION: 1

## 2024-04-02 NOTE — PROGRESS NOTES
Physical Therapy    Physical Therapy Lumbar Spine Evaluation    Patient Name: Miguelina Gloria  MRN: [id number]  Today's Date: 2024  Time Calculation  Start Time: 322  Stop Time: 0  Time Calculation (min): 38 min  PT Evaluation Time Entry  PT Evaluation (Low) Time Entry: 30  PT Therapeutic Procedures Time Entry  Therapeutic Exercise Time Entry: 8             Visit Number: 1  Auth Dates: Auth not required. Based on medical necessity    Current Problem  Problem List Items Addressed This Visit             ICD-10-CM    Upper back pain - Primary M54.9    Relevant Orders    Follow Up In Physical Therapy    Midline low back pain M54.50    Relevant Orders    Follow Up In Physical Therapy     Other Visit Diagnoses         Codes    Bilateral hip pain     M25.551, M25.552    Relevant Orders    Follow Up In Physical Therapy               General  Name and  confirmed with patient on this date.     Precautions  Precautions  STEADI Fall Risk Score (The score of 4 or more indicates an increased risk of falling): 10  Precautions Comment: POTS       Pain  Pain Assessment: 0-10  Pain Score:  (ranges from 3-8/10)  Pain Location: Back    SUBJECTIVE:   Chief complaint:    Pt reports that she fell while in the shower and struck her back in 2023. Pt reports that she has had pain from thoracic region to lumbar region since that time. Pain has persisted since that time. Tried medrol pack which gave her some relief. Continues to have difficulty sleeping due to pain. Twisting motions, reaching for objects, prolonged sitting, standing > 5 mins all aggravate pain.    Pain Better: heating pad for lower back  Imaging:  Prior level of function: Independent without back pain.  Current limitations:   Home setup:  Work: . Sitting at computer, walking to printer down the garcia  Patients goal: To be able to sleep, wash dishes, etc without pain  Prior tx: none    OBJECTIVE:      Spine ROM: WNL Unless documented below:  ROM  (In degrees)   Flexion 60   Extension 10    Right Left   Side Bend 12 14   Rotation         Lower Extremity Strength: (5/5 unless noted)   RIGHT LEFT   Hip Flexion 4+/5 4+/5   Hip Abduction 4+/5 4+/5   Knee Extension 4+/5 4+/5   Knee Flexion 4+/5 4+/5   Ankle DF 4+/5 4+/560   Ankle EV     Gr. Toe Extension       Palpation: diffuse tenderness from lower cervical through lower lumbar spine    Special tests:  Slump negative  SLR negative        Other Measures  Oswestry Disablity Index (CASSIUS): 23 (raw score)     TREATMENT:  EXERCISES Date 4/2/24 Date Date Date    REPS REPS REPS REPS          Trunk flexion with ball 10X ea       Mid row Blue 2 X 10      Pull downs Blue 2 X 10                                                                                                                                                                                            HEP          Access Code: [email or web address]  URL: [email or web address]  Date: 04/02/2024  Prepared by: Dora Doe    Exercises  - Standing Shoulder Row with Anchored Resistance  - 1 x daily - 7 x weekly - 3 sets - 10 reps  - Shoulder extension with resistance - Neutral  - 1 x daily - 7 x weekly - 3 sets - 10 reps  ASSESSEMENT  Pt is a 38 y.o.  referred for physical therapy by Cherelle Rico  for back pain. Pt presents with decreased thoracic and lumbar ROM, decreased trunk strength and limited ability to perform functional activities. This patient would benefit from a therapy program to restore prior level of function, decrease pain, increase AROM, increase strength and improve posture.    The physical therapy prognosis is good for the patient to achieve their goals.   The pt tolerated therapy treatment today with no adverse effects.  Barriers to therapy/learning include:  obesity    PLAN  The pt will be seen 1-2 days a week for 4-8 weeks.      The pt has been educated about the risks and benefits of physical therapy and gives consent for  treatment.     The patient will benefit from physical therapy treatment to include:      Goals:    1. Improve thoracic and lumbar mobility so that patient can perform self care activities without difficulty or increased pain-3-4 weeks    2.Improve dynamic core strength so  that patient can tolerate standing > 15-20 minutes without increased back pain-4-6 weeks    3. Pt will be able to sleep 4-6 hours without waking due to back pain-4-6 weeks    4.Improve Modified CASSIUS score < 30% to facilitate return to prior level of function-6-8 weeks    5.Pt will be independent with HEP-8 weeks

## 2024-04-08 ENCOUNTER — DOCUMENTATION (OUTPATIENT)
Dept: PHYSICAL THERAPY | Facility: CLINIC | Age: 39
End: 2024-04-08
Payer: COMMERCIAL

## 2024-04-08 ENCOUNTER — APPOINTMENT (OUTPATIENT)
Dept: PHYSICAL THERAPY | Facility: CLINIC | Age: 39
End: 2024-04-08
Payer: COMMERCIAL

## 2024-04-08 NOTE — PROGRESS NOTES
Physical Therapy                 Therapy Communication Note    Patient Name: Miguelina Gloria  MRN: [id number]  Today's Date: 4/8/2024     Discipline: Physical Therapy    Missed Visit Reason:  Patient called to cancel due to working.     Missed Time: Cancel    Comment:

## 2024-04-12 ENCOUNTER — APPOINTMENT (OUTPATIENT)
Dept: PHYSICAL THERAPY | Facility: CLINIC | Age: 39
End: 2024-04-12
Payer: COMMERCIAL

## 2024-04-15 ENCOUNTER — TREATMENT (OUTPATIENT)
Dept: PHYSICAL THERAPY | Facility: CLINIC | Age: 39
End: 2024-04-15
Payer: COMMERCIAL

## 2024-04-15 DIAGNOSIS — M54.50 MIDLINE LOW BACK PAIN, UNSPECIFIED CHRONICITY, UNSPECIFIED WHETHER SCIATICA PRESENT: ICD-10-CM

## 2024-04-15 DIAGNOSIS — M54.9 UPPER BACK PAIN: ICD-10-CM

## 2024-04-15 DIAGNOSIS — M25.552 BILATERAL HIP PAIN: ICD-10-CM

## 2024-04-15 DIAGNOSIS — M25.551 BILATERAL HIP PAIN: ICD-10-CM

## 2024-04-15 PROCEDURE — 97110 THERAPEUTIC EXERCISES: CPT | Mod: GP | Performed by: PHYSICAL THERAPIST

## 2024-04-15 ASSESSMENT — PAIN - FUNCTIONAL ASSESSMENT: PAIN_FUNCTIONAL_ASSESSMENT: 0-10

## 2024-04-15 ASSESSMENT — PAIN SCALES - GENERAL: PAINLEVEL_OUTOF10: 3

## 2024-04-15 NOTE — PROGRESS NOTES
Physical Therapy    Physical Therapy Treatment    Patient Name: Miguelina Gloria  MRN: [id number]  : 1985   Today's Date: 4/15/2024  Time Calculation  Start Time: 1520  Stop Time: 1604  Time Calculation (min): 44 min  PT Therapeutic Procedures Time Entry  Therapeutic Exercise Time Entry: 42           Visit Number: 2  Auth: not required    Current Problem  Problem List Items Addressed This Visit             ICD-10-CM       Musculoskeletal and Injuries    Upper back pain M54.9    Midline low back pain M54.50     Other Visit Diagnoses         Codes    Bilateral hip pain     M25.551, M25.552             Subjective   Pt states she has pain everywhere in her body. Hips and knees hurt with NuStep and she is afraid to use her arms on NuStep because her shoulders are popping.  Pt reports compliance with her HEP and no issues.  Precautions   Precautions  STEADI Fall Risk Score (The score of 4 or more indicates an increased risk of falling): 10  Precautions Comment: POTS    Pain  Pain Assessment: 0-10  Pain Score: 3  Pain Location: Back    Objective      No new abnormalities observed.     Outcome Measures:  Not today.     Treatments:    EXERCISES Date 24 Date 4/15/2024   Date Date    REPS REPS REPS REPS          Trunk flexion with ball 10X ea  10x ea     Mid row Blue 2 X 10 Blue 20x     Pull downs Blue 2 X 10 Blue 20x     Skis  Blue 20x     Lateral walkouts              NuStep  L4 10 min            Shuttle DLP  5B 2 x15                 SLP  3B 2x10 ea                 HR/TR  4B 20x                                                                                                                                   HEP            Assessment:   Initiated POC, see flowsheet for details. Pt tolerated treatment with moderate difficulty due to fatigue/weakness and intermittent diffuse body pain including hips, knees and shoulders.     Plan:   Assess between-session response to treatment and adjust POC as appropriate.    Goals:     1. Improve thoracic and lumbar mobility so that patient can perform self care activities without difficulty or increased pain-3-4 weeks    2.Improve dynamic core strength so  that patient can tolerate standing > 15-20 minutes without increased back pain-4-6 weeks    3. Pt will be able to sleep 4-6 hours without waking due to back pain-4-6 weeks    4.Improve Modified CASSIUS score < 30% to facilitate return to prior level of function-6-8 weeks    5.Pt will be independent with HEP-8 weeks

## 2024-04-18 ENCOUNTER — TREATMENT (OUTPATIENT)
Dept: PHYSICAL THERAPY | Facility: CLINIC | Age: 39
End: 2024-04-18
Payer: COMMERCIAL

## 2024-04-18 DIAGNOSIS — M54.50 MIDLINE LOW BACK PAIN, UNSPECIFIED CHRONICITY, UNSPECIFIED WHETHER SCIATICA PRESENT: ICD-10-CM

## 2024-04-18 DIAGNOSIS — M25.552 BILATERAL HIP PAIN: ICD-10-CM

## 2024-04-18 DIAGNOSIS — M54.9 UPPER BACK PAIN: ICD-10-CM

## 2024-04-18 DIAGNOSIS — M25.551 BILATERAL HIP PAIN: ICD-10-CM

## 2024-04-18 PROCEDURE — 97110 THERAPEUTIC EXERCISES: CPT | Mod: GP,CQ

## 2024-04-18 ASSESSMENT — PAIN - FUNCTIONAL ASSESSMENT: PAIN_FUNCTIONAL_ASSESSMENT: 0-10

## 2024-04-18 ASSESSMENT — PAIN SCALES - GENERAL: PAINLEVEL_OUTOF10: 2

## 2024-04-18 NOTE — PROGRESS NOTES
Physical Therapy    Physical Therapy Treatment    Patient Name: Miguelina Gloria  MRN: [id number]  :1985  Today's Date: 2024  Time Calculation  Start Time: 1710  Stop Time: 1755  Time Calculation (min): 45 min  PT Therapeutic Procedures Time Entry  Therapeutic Exercise Time Entry: 43           Visit: 3  Visit limit: Visits based on medical necessity    Assessment:   Patient performed added exercises without complaints of increased back or bilateral hip symptoms. Patient presented with improved trunk flexion range of motion measurements since last recorded measurements.       Plan:   Continue with trunk and lower extremity flexibility and strengthening program progressing as tolerated to decrease pain with standing and walking activities.     Patient RTD as needed.     Current Problem  1. Upper back pain  Follow Up In Physical Therapy      2. Midline low back pain, unspecified chronicity, unspecified whether sciatica present  Follow Up In Physical Therapy      3. Bilateral hip pain  Follow Up In Physical Therapy          Subjective    Patient arrived 10 minutes late to appointment.   Patient reports continues to experience bilateral hip discomfort with daily and work activities typically left > right.      Precautions  Precautions  Precautions Comment: POTS    Pain  Pain Assessment  Pain Assessment: 0-10  Pain Score: 2  Pain Location: Back    Objective   AROM lumbar seated with support of swiss ball  Flexion = 70 degrees    Outcome Measures:  Other Measures  Oswestry Disablity Index (CASSIUS): 23 (raw score) (Score at initial evaluation)    Treatments:  EXERCISES Date 24 Date 4/15/2024 Date   2024 Date    REPS REPS Visit: 3 REPS          Trunk flexion with ball 10X ea  10x ea 10 each silver ball    Mid row Blue 2 X 10 Blue 20x Blue x 20    Pull downs Blue 2 X 10 Blue 20x Blue x 20    Skis  Blue 20x Blue x 20    Lateral walkouts              NuStep  L4 10 min L4 10 minutes            Shuttle DLP  5B 2  x15 5B 3 x 10                SLP  3B 2x10 ea 3B 3 x 10                HR/TR  4B 20x 5B 3 x 10           Back extension   50# 3 x 12           Multi hip flexion    30# x 10    Multi hip abduction   30# x 10                                                                                               HEP           OP EDUCATION:       Goals:    1. Improve thoracic and lumbar mobility so that patient can perform self care activities without difficulty or increased pain-3-4 weeks   04/18/2024 progressing    2.Improve dynamic core strength so  that patient can tolerate standing > 15-20 minutes without increased back pain-4-6 weeks    3. Pt will be able to sleep 4-6 hours without waking due to back pain-4-6 weeks    4.Improve Modified CASSIUS score < 30% to facilitate return to prior level of function-6-8 weeks    5.Pt will be independent with HEP-8 weeks

## 2024-04-22 ENCOUNTER — TREATMENT (OUTPATIENT)
Dept: PHYSICAL THERAPY | Facility: CLINIC | Age: 39
End: 2024-04-22
Payer: COMMERCIAL

## 2024-04-22 DIAGNOSIS — M54.9 UPPER BACK PAIN: ICD-10-CM

## 2024-04-22 DIAGNOSIS — M54.50 MIDLINE LOW BACK PAIN, UNSPECIFIED CHRONICITY, UNSPECIFIED WHETHER SCIATICA PRESENT: ICD-10-CM

## 2024-04-22 DIAGNOSIS — M25.551 BILATERAL HIP PAIN: ICD-10-CM

## 2024-04-22 DIAGNOSIS — M25.552 BILATERAL HIP PAIN: ICD-10-CM

## 2024-04-22 PROCEDURE — 97110 THERAPEUTIC EXERCISES: CPT | Mod: GP | Performed by: PHYSICAL THERAPIST

## 2024-04-22 ASSESSMENT — PAIN SCALES - GENERAL: PAINLEVEL_OUTOF10: 2

## 2024-04-22 ASSESSMENT — PAIN - FUNCTIONAL ASSESSMENT: PAIN_FUNCTIONAL_ASSESSMENT: 0-10

## 2024-04-22 NOTE — PROGRESS NOTES
Physical Therapy    Physical Therapy Treatment    Patient Name: Miguelina Gloria  MRN: [id number]  :1985  Today's Date: 2024  Time Calculation  Start Time: 257  Stop Time: 337  Time Calculation (min): 40 min  PT Therapeutic Procedures Time Entry  Therapeutic Exercise Time Entry: 40           Visit: 4  Visit limit: Visits based on medical necessity    Assessment:   Sleep continues to be disturbed by back pain. Pt only able to sleep 2 hours at a time without waking due to back pain.      Plan:   Continue with trunk and lower extremity flexibility and strengthening program progressing as tolerated to decrease pain with standing and walking activities.     Patient RTD as needed.     Current Problem  1. Upper back pain  Follow Up In Physical Therapy      2. Midline low back pain, unspecified chronicity, unspecified whether sciatica present  Follow Up In Physical Therapy      3. Bilateral hip pain  Follow Up In Physical Therapy          Subjective    Patient arrived 14 minutes late to appointment.   Patient reports that her pain is mild today.      Precautions  Precautions  Precautions Comment: POTS    Pain  Pain Assessment  Pain Assessment: 0-10  Pain Score: 2  Pain Location: Back    Objective   AROM lumbar seated with support of swiss ball  Flexion = 70 degrees    Outcome Measures:       Treatments:  EXERCISES Date 24 Date 4/15/2024 Date   2024 Date 24    REPS REPS Visit: 3 REPS 4          Trunk flexion with ball 10X ea  10x ea 10 each silver ball    Mid row Blue 2 X 10 Blue 20x Blue x 20 Blue  X 20   Pull downs Blue 2 X 10 Blue 20x Blue x 20 Blue X 20   Skis  Blue 20x Blue x 20 Blue X 20   Lateral walkouts              NuStep  L4 10 min L4 10 minutes  L4  10 mins          Shuttle DLP  5B 2 x15 5B 3 x 10 5B  3 X 10               SLP  3B 2x10 ea 3B 3 x 10 3B  3 X 10               HR/TR  4B 20x 5B 3 x 10 5B  3 X 10          Back extension   50# 3 x 12 50#  3 X 12          Multi hip flexion    30#  x 10 30#  X 10   Multi hip abduction   30# x 10 30#  X 10                                                                                              HEP           OP EDUCATION:       Goals:    1. Improve thoracic and lumbar mobility so that patient can perform self care activities without difficulty or increased pain-3-4 weeks   04/18/2024 progressing    2.Improve dynamic core strength so  that patient can tolerate standing > 15-20 minutes without increased back pain-4-6 weeks    3. Pt will be able to sleep 4-6 hours without waking due to back pain-4-6 weeks    4.Improve Modified CASSIUS score < 30% to facilitate return to prior level of function-6-8 weeks    5.Pt will be independent with HEP-8 weeks

## 2024-04-26 ENCOUNTER — TREATMENT (OUTPATIENT)
Dept: PHYSICAL THERAPY | Facility: CLINIC | Age: 39
End: 2024-04-26
Payer: COMMERCIAL

## 2024-04-26 DIAGNOSIS — M54.9 UPPER BACK PAIN: ICD-10-CM

## 2024-04-26 DIAGNOSIS — M25.551 BILATERAL HIP PAIN: ICD-10-CM

## 2024-04-26 DIAGNOSIS — M25.552 BILATERAL HIP PAIN: ICD-10-CM

## 2024-04-26 DIAGNOSIS — M54.50 MIDLINE LOW BACK PAIN, UNSPECIFIED CHRONICITY, UNSPECIFIED WHETHER SCIATICA PRESENT: ICD-10-CM

## 2024-04-26 PROCEDURE — 97110 THERAPEUTIC EXERCISES: CPT | Mod: GP,CQ

## 2024-04-26 ASSESSMENT — PAIN DESCRIPTION - DESCRIPTORS: DESCRIPTORS: TIGHTNESS

## 2024-04-26 ASSESSMENT — PAIN - FUNCTIONAL ASSESSMENT: PAIN_FUNCTIONAL_ASSESSMENT: 0-10

## 2024-04-26 ASSESSMENT — PAIN SCALES - GENERAL: PAINLEVEL_OUTOF10: 2

## 2024-04-26 NOTE — PROGRESS NOTES
Physical Therapy    Physical Therapy Treatment    Patient Name: Miguelina Gloria  MRN: [id number]  :1985  Today's Date: 2024  Time Calculation  Start Time: 0800  Stop Time: 0846  Time Calculation (min): 46 min  PT Therapeutic Procedures Time Entry  Therapeutic Exercise Time Entry: 44           Visit: 5  Visit limit: Visits based on medical necessity    Assessment:   Patient tolerated increases in exercise program without complaints of increased lumbar or lower extremity symptoms. Patient indicates being able to stand and walk for longer periods of time before symptoms begin to present.     Plan:   Continue with trunk and lower extremity flexibility and strengthening program progressing as tolerated to decrease pain with standing and walking activities.     Patient RTD as needed.     Current Problem  1. Upper back pain  Follow Up In Physical Therapy      2. Midline low back pain, unspecified chronicity, unspecified whether sciatica present  Follow Up In Physical Therapy      3. Bilateral hip pain  Follow Up In Physical Therapy          Subjective    Patient reports overall decreases in back and hip discomfort with daily activities.     Precautions  Precautions  Precautions Comment: POTS    Pain  Pain Assessment  Pain Assessment: 0-10  Pain Score: 2  Pain Location: Back  Pain Descriptors: Tightness    Objective   Increased resistance to majority of exercises. See exercise log for specifics.     AROM lumbar seated with support of swiss ball  Flexion = 78 degrees    Outcome Measures:  Other Measures  Oswestry Disablity Index (CASSIUS): 23 (raw score) (Score at initial evaluation)    Treatments:  EXERCISES Date   2024 Date Date  Date     Visit 5 Visit Visit:  Visit          Trunk flexion with ball 10 x each       Mid row Blue x 20      Pull downs Blue x 20      Skis Blue x 20      Lateral walkouts              NuStep L5 10 minutes             Shuttle DLP 6B 3 x 15      Shuttle DTR 6B 3 x 15      Shuttle SLP 4B  3 x 15             Back extension 60# 3 x 20   50#  3 X 12          Multi hip flexion  60# 2 x 10      Multi hip abduction 60# 2 x 10                                                                                                 HEP           OP EDUCATION:       Goals:    1. Improve thoracic and lumbar mobility so that patient can perform self care activities without difficulty or increased pain-3-4 weeks   04/26/2024 progressing    2.Improve dynamic core strength so  that patient can tolerate standing > 15-20 minutes without increased back pain-4-6 weeks   04/26/2024 progressing    3. Pt will be able to sleep 4-6 hours without waking due to back pain-4-6 weeks    4.Improve Modified CASSIUS score < 30% to facilitate return to prior level of function-6-8 weeks    5.Pt will be independent with HEP-8 weeks

## 2024-04-30 ENCOUNTER — TREATMENT (OUTPATIENT)
Dept: PHYSICAL THERAPY | Facility: CLINIC | Age: 39
End: 2024-04-30
Payer: COMMERCIAL

## 2024-04-30 DIAGNOSIS — M54.9 UPPER BACK PAIN: ICD-10-CM

## 2024-04-30 DIAGNOSIS — M54.50 MIDLINE LOW BACK PAIN, UNSPECIFIED CHRONICITY, UNSPECIFIED WHETHER SCIATICA PRESENT: ICD-10-CM

## 2024-04-30 DIAGNOSIS — M25.552 BILATERAL HIP PAIN: ICD-10-CM

## 2024-04-30 DIAGNOSIS — M25.551 BILATERAL HIP PAIN: ICD-10-CM

## 2024-04-30 PROCEDURE — 97110 THERAPEUTIC EXERCISES: CPT | Mod: GP,CQ

## 2024-04-30 ASSESSMENT — PAIN SCALES - GENERAL: PAINLEVEL_OUTOF10: 2

## 2024-04-30 ASSESSMENT — PAIN DESCRIPTION - DESCRIPTORS: DESCRIPTORS: TIGHTNESS

## 2024-04-30 ASSESSMENT — PAIN - FUNCTIONAL ASSESSMENT: PAIN_FUNCTIONAL_ASSESSMENT: 0-10

## 2024-04-30 NOTE — PROGRESS NOTES
Physical Therapy    Physical Therapy Treatment    Patient Name: Miguelina Gloria  MRN: [id number]  :1985  Today's Date: 2024  Time Calculation  Start Time: 1615  Stop Time: 1703  Time Calculation (min): 48 min  PT Therapeutic Procedures Time Entry  Therapeutic Exercise Time Entry: 45           Visit: 5  Visit limit: Visits based on medical necessity    Assessment:   Patient tolerated increases in exercise program without complaints of increased lumbar or lower extremity symptoms. Patient reports typically by the end of the work day back and legs are tired and sore.    Plan:   Continue with trunk and lower extremity flexibility and strengthening program progressing as tolerated to decrease pain with standing and walking activities.     Patient RTD as needed.     Current Problem  1. Upper back pain  Follow Up In Physical Therapy      2. Midline low back pain, unspecified chronicity, unspecified whether sciatica present  Follow Up In Physical Therapy      3. Bilateral hip pain  Follow Up In Physical Therapy          Subjective    Patient reports feeling okay today. Patient reports continues to experience back and hip stiffness with prolonged standing and waling activities. Patient reports intensity and frequency have decreased since starting PT.     Precautions  Precautions  Precautions Comment: POTS    Pain  Pain Assessment  Pain Assessment: 0-10  Pain Score: 2  Pain Location: Back  Pain Descriptors: Tightness    Objective   AROM lumbar seated with support of swiss ball  Flexion = 80 degrees    Outcome Measures:  Other Measures  Oswestry Disablity Index (CASSIUS): 23 (raw score) (Score at initial evaluation)    Treatments:  EXERCISES Date   2024 Date   2024 Date  Date     Visit 5 Visit  6 Visit:  Visit          Trunk flexion with ball 10 x each  10 x each     Mid row Blue x 20 Black x 20     Pull downs Blue x 20 Black x 20     Skis Blue x 20 Black x 20     Lateral walkouts              NuStep L5 10  minutes L5 10 minutes            Shuttle DLP 6B 3 x 15 6B 3 x 20     Shuttle DTR 6B 3 x 15 6B 3 x 20     Shuttle SLP 4B 3 x 15 4B 3 x 20            Back extension 60# 3 x 20 80# 3 x 15  50#  3 X 12          Multi hip flexion  60# 2 x 10 60# x 25     Multi hip abduction 60# 2 x 10 60# x 25                                                                                                HEP           OP EDUCATION:       Goals:    1. Improve thoracic and lumbar mobility so that patient can perform self care activities without difficulty or increased pain-3-4 weeks   04/30/2024 progressing    2.Improve dynamic core strength so  that patient can tolerate standing > 15-20 minutes without increased back pain-4-6 weeks   04/30/2024 progressing    3. Pt will be able to sleep 4-6 hours without waking due to back pain-4-6 weeks    4.Improve Modified CASSIUS score < 30% to facilitate return to prior level of function-6-8 weeks    5.Pt will be independent with HEP-8 weeks

## 2024-05-02 DIAGNOSIS — M79.7 FIBROMYALGIA: Primary | ICD-10-CM

## 2024-05-02 RX ORDER — DULOXETIN HYDROCHLORIDE 30 MG/1
60 CAPSULE, DELAYED RELEASE ORAL DAILY
Qty: 120 CAPSULE | Refills: 1 | Status: SHIPPED | OUTPATIENT
Start: 2024-05-02 | End: 2024-05-20 | Stop reason: SDUPTHER

## 2024-05-06 ENCOUNTER — TREATMENT (OUTPATIENT)
Dept: PHYSICAL THERAPY | Facility: CLINIC | Age: 39
End: 2024-05-06
Payer: COMMERCIAL

## 2024-05-06 DIAGNOSIS — M54.50 MIDLINE LOW BACK PAIN: Primary | ICD-10-CM

## 2024-05-06 DIAGNOSIS — M54.9 UPPER BACK PAIN: ICD-10-CM

## 2024-05-06 PROCEDURE — 97110 THERAPEUTIC EXERCISES: CPT | Mod: GP | Performed by: PHYSICAL THERAPIST

## 2024-05-06 ASSESSMENT — PAIN SCALES - GENERAL: PAINLEVEL_OUTOF10: 2

## 2024-05-06 ASSESSMENT — PAIN - FUNCTIONAL ASSESSMENT: PAIN_FUNCTIONAL_ASSESSMENT: 0-10

## 2024-05-06 NOTE — PROGRESS NOTES
Physical Therapy    Physical Therapy Treatment/Reassessment    Patient Name: Miguelina Gloria  MRN: [id number]  :1985  Today's Date: 2024  Time Calculation  Start Time: 420  Stop Time: 520  Time Calculation (min): 60 min  PT Therapeutic Procedures Time Entry  Therapeutic Exercise Time Entry: 50           Visit: 7  Visit limit: Visits based on medical necessity    Assessment:   Pain levels are decreasing with functional activities since starting PT. Lumbar ROM and strength is improving. Pt needs continued PT to further improve trunk strength.    Plan:   Continue with trunk and lower extremity flexibility and strengthening program progressing as tolerated to decrease pain with standing and walking activities.     Patient RTD as needed.     Current Problem  1. Midline low back pain  Follow Up In Physical Therapy      2. Upper back pain              Subjective    Pt reports that her back pain is improving since starting PT. She is waking up less frequently due to back pain. Decreased pain with twisting motions and reaching for objects. Still has pain when reaching down low for objects. Can tolerate prolonged sitting for longer periods depending on the type of chair. Continues to have difficulty with prolonged standing.        Precautions  Precautions  Precautions Comment: POTS    Pain  Pain Assessment  Pain Assessment: 0-10  Pain Score: 2  Pain Location: Back    Objective   Lumbar ROM:  Flexion: 80 degrees  extension: 14 degrees    LE strength:  4+/5 bilat throughout  Special tests:    Outcome Measures:  Other Measures  Oswestry Disablity Index (CASSIUS): 23 (raw score)    Treatments:  EXERCISES Date   2024 Date   2024 Date 24 Date     Visit 5 Visit  6 Visit: 7 Visit          Trunk flexion with ball 10 x each  10 x each 10X    Mid row Blue x 20 Black x 20 Black X 20    Pull downs Blue x 20 Black x 20 Black X 20    Skis Blue x 20 Black x 20 Black X 20    Lateral walkouts              NuStep L5 10  minutes L5 10 minutes L5  10 mins           Shuttle DLP 6B 3 x 15 6B 3 x 20 6B  3 X 20    Shuttle DTR 6B 3 x 15 6B 3 x 20 6B  3 X 20    Shuttle SLP 4B 3 x 15 4B 3 x 20 4B   3 X 20           Back extension 60# 3 x 20 80# 3 x 15 80#  3 X 15           Multi hip flexion  60# 2 x 10 60# x 25 60#  X 25    Multi hip abduction 60# 2 x 10 60# x 25 60#  X 25                                                                                               HEP           OP EDUCATION:       Goals:    1. Improve thoracic and lumbar mobility so that patient can perform self care activities without difficulty or increased pain-3-4 weeks-goal met    2.Improve dynamic core strength so  that patient can tolerate standing > 15-20 minutes without increased back pain-4-6 weeks   05/6/2024 progressing    3. Pt will be able to sleep 4-6 hours without waking due to back pain-4-6 weeks-5/6/24 progressing    4.Improve Modified CASSIUS score < 30% to facilitate return to prior level of function-6-8 weeks-5/6/24 progressing    5.Pt will be independent with HEP-8 weeks

## 2024-05-09 ENCOUNTER — OFFICE VISIT (OUTPATIENT)
Dept: OTOLARYNGOLOGY | Facility: CLINIC | Age: 39
End: 2024-05-09
Payer: COMMERCIAL

## 2024-05-09 VITALS
WEIGHT: 293 LBS | RESPIRATION RATE: 16 BRPM | SYSTOLIC BLOOD PRESSURE: 131 MMHG | DIASTOLIC BLOOD PRESSURE: 89 MMHG | BODY MASS INDEX: 44.41 KG/M2 | OXYGEN SATURATION: 97 % | HEART RATE: 79 BPM | TEMPERATURE: 97.3 F | HEIGHT: 68 IN

## 2024-05-09 DIAGNOSIS — R09.81 NASAL CONGESTION: ICD-10-CM

## 2024-05-09 DIAGNOSIS — J31.0 CHRONIC RHINITIS: ICD-10-CM

## 2024-05-09 PROCEDURE — 99213 OFFICE O/P EST LOW 20 MIN: CPT | Mod: GC | Performed by: STUDENT IN AN ORGANIZED HEALTH CARE EDUCATION/TRAINING PROGRAM

## 2024-05-09 PROCEDURE — 99213 OFFICE O/P EST LOW 20 MIN: CPT | Performed by: STUDENT IN AN ORGANIZED HEALTH CARE EDUCATION/TRAINING PROGRAM

## 2024-05-09 PROCEDURE — 1036F TOBACCO NON-USER: CPT | Performed by: STUDENT IN AN ORGANIZED HEALTH CARE EDUCATION/TRAINING PROGRAM

## 2024-05-09 RX ORDER — OLOPATADINE HYDROCHLORIDE AND MOMETASONE FUROATE 25; 665 UG/1; UG/1
2 SPRAY, METERED NASAL DAILY
Qty: 29 G | Refills: 11 | Status: SHIPPED | OUTPATIENT
Start: 2024-05-09

## 2024-05-09 SDOH — ECONOMIC STABILITY: FOOD INSECURITY: WITHIN THE PAST 12 MONTHS, THE FOOD YOU BOUGHT JUST DIDN'T LAST AND YOU DIDN'T HAVE MONEY TO GET MORE.: NEVER TRUE

## 2024-05-09 SDOH — ECONOMIC STABILITY: FOOD INSECURITY: WITHIN THE PAST 12 MONTHS, YOU WORRIED THAT YOUR FOOD WOULD RUN OUT BEFORE YOU GOT MONEY TO BUY MORE.: NEVER TRUE

## 2024-05-09 ASSESSMENT — LIFESTYLE VARIABLES
AUDIT-C TOTAL SCORE: 0
SKIP TO QUESTIONS 9-10: 1
HOW OFTEN DO YOU HAVE A DRINK CONTAINING ALCOHOL: NEVER
HOW MANY STANDARD DRINKS CONTAINING ALCOHOL DO YOU HAVE ON A TYPICAL DAY: PATIENT DOES NOT DRINK
HOW OFTEN DO YOU HAVE SIX OR MORE DRINKS ON ONE OCCASION: NEVER

## 2024-05-09 ASSESSMENT — COLUMBIA-SUICIDE SEVERITY RATING SCALE - C-SSRS
1. IN THE PAST MONTH, HAVE YOU WISHED YOU WERE DEAD OR WISHED YOU COULD GO TO SLEEP AND NOT WAKE UP?: NO
2. HAVE YOU ACTUALLY HAD ANY THOUGHTS OF KILLING YOURSELF?: NO
6. HAVE YOU EVER DONE ANYTHING, STARTED TO DO ANYTHING, OR PREPARED TO DO ANYTHING TO END YOUR LIFE?: NO

## 2024-05-09 ASSESSMENT — ENCOUNTER SYMPTOMS
OCCASIONAL FEELINGS OF UNSTEADINESS: 1
DEPRESSION: 0
LOSS OF SENSATION IN FEET: 0

## 2024-05-09 ASSESSMENT — PATIENT HEALTH QUESTIONNAIRE - PHQ9
SUM OF ALL RESPONSES TO PHQ9 QUESTIONS 1 AND 2: 0
2. FEELING DOWN, DEPRESSED OR HOPELESS: NOT AT ALL
1. LITTLE INTEREST OR PLEASURE IN DOING THINGS: NOT AT ALL

## 2024-05-09 ASSESSMENT — PAIN SCALES - GENERAL: PAINLEVEL: 0-NO PAIN

## 2024-05-09 NOTE — PROGRESS NOTES
SUBJECTIVE  Patient ID: Miguelina Gloria is a 38 y.o. adult who presents for Follow-up (Sinus issues).    History: 34-year-old female referred by Dr. Cherelle Rico for evaluation of bilateral otalgia. Review of the chart the patient was seen in urgent care 10/26/2024 bilateral otalgia and impacted cerumen. The ear canals were irrigated and she was prescribed fluticasone nasal spray. She then saw her primary care doctor who started her on a course of omeprazole (11/20/2020). She has a previous upper endoscopy from 2017 which demonstrated a normal esophagus and Z line.     Patient reports that 10/26 she noted left aural fullness and right ear popping with swallowing. Despite use of fluticasone her left ear started popping as well as the right. Still having ear pain today. No preceding illnesses. No sick contacts. No history of ear surgery. No change in hearing, tinnitus, otorrhea. Rare dizziness; described as lightheadedness and imbalance. Does use a mouth guard nightly. Believes she grinds her teeth somewhat.     She reports that 11/20 she continued to have symptoms of acid reflux with coffee. Notes a sensation of water getting stuck in her throat. She reports that she has had some regurgitation of food and coffee in one case. Notes some issues with peanut butter as well as some other liquids. Is able to take her vitamin and allergy pills without issue. She had a previous GI work-up with upper endoscopy as noted above, but no definitive cause for persistent stomach pain was identified. She notes that she continues to have abdominal pains daily.     Update 1/25/2021:  Follow-up for bilateral otalgia concerning for TMJ dysfunction and dysphagia. And last visit we opted for conservative course to see if her ear symptoms might resolve. In addition, a MBS was ordered for dysphagia symptoms. She reports that her ear popping has improved but still occurs throughout the day. Otalgia is significantly improved with soft  diet and continued use of the mouth guard. She notes some continued issues with swallow but has had improvement with eliminating troublesome foods from her diet. MBS is scheduled for early in February (she has been busy with work and recent promotion). She is on fluticasone nasal spray for her allergic rhinitis but has not attempted sinonasal irrigations. She notes longstanding history of environmental allergies.     Update 3/22/2021:  Follow-up for bilateral otalgia and dysphagia. She is now status post [facility] with finding of possible esophageal dysmotility. GI consult is pending today.  She reports that her hearing is roughly the same. Notes continued left aural fullness and muffled hearing. She has been using fluticasone 2 sprays BID; irrigating roughly once a week. She has continued to have a popping sensation, only on the right. Still using  which has significantly improved jaw pain/otalgia.     Update 10/19/2022:  Noting some increased headaches, nasal pain in August 2022 with COVID-19. She notes pain along the nasal dorsum and along frontal sinuses and malar eminences. Takes a daily Allegra-D. She is using Nasacort 2 sprays once a day. Nasal dryness. She is noting more nasal congestion and some increased drainage.     Update 12/12/2022: Recently started a new job at [employer].  Has noted some improvement in facial pain and headache; has improved with combination of propranolol and gabapentin (started one month ago). Still noting some nasal congestion. Using Nasacort 2 sprays once a day. No change in hearing.     Update 3/13/2023: Now working full time at [employer]  Follow-up for chronic rhinitis and headache.  1. Reports that she will sometimes wake with globus sensation.  2. She notes that she finds that the combination of Nasacort/azelastine has been helpful for nasal congestion. No new concerns.  3. She reports persistent facial pressure although this remains improved with current medications as  prescribed by neurology. No significant exacerbations. Thinks that this may be influenced by wearing her several glasses.     Update 5/9/2024:    Follow-up for chronic rhinitis  Nasacort/azelastine nasal sprays are working well to control her Symptoms. She would prefer the combo.   No further issues to discuss today    OBJECTIVE  Physical Exam  CONSTITUTIONAL: Well appearing female who appears stated age.  VOICE: Clear speech without hoarseness. No stridor nor stertor.  RESPIRATORY: Normal inspiration and expiration and chest wall expansion; no use of accessory muscles to breathe.  PSYCHIATRIC: Alert, appropriate mood and affect.   HEAD, FACE, AND SKIN: Symmetric facial feature. No cutaneous masses or lesions were visualized.  NOSE: Nasal dorsum was midline. On anterior rhinoscopy the patient has a midline, intact nasal septum. Mild ITH today. No obvious drainage appreciated.  OROPHARYNX: No lesion nor mucosal abnormality. The uvula was normal appearing. The tonsils were 1+ bilaterally. No drainage in the oropharynx.      ASSESSMENT/PLAN  Diagnoses and all orders for this visit:  Chronic rhinitis  -     olopatadine-mometasone (Ryaltris) 665-25 mcg/spray spray,non-aerosol; Administer 2 sprays into affected nostril(s) once daily. Use 2 sprays to each nostril once daily.  Nasal congestion  -     olopatadine-mometasone (Ryaltris) 665-25 mcg/spray spray,non-aerosol; Administer 2 sprays into affected nostril(s) once daily. Use 2 sprays to each nostril once daily.      38 y.o. adult  seen for multiple issues. Wants to try Ryaltris     1. Chronic/allergic rhinitis  Previously the patient had some mild nasal congestion and nasal dryness which has been well controlled with use of Fluticasone and Azelastine. In general she is happy with improvement in symptoms. However, she would like to have a combo nasal spray to minimize the number of sprays she is using. She has a history of nasal dryness, so we will proceed with Ryaltris  (Olapatadine and Mometasone).    Follow-up in one year.     This note was created using speech recognition transcription software. Despite proofreading, typographical errors may be present that affect the meaning of the content. Please contact my office with any questions.

## 2024-05-10 ENCOUNTER — TREATMENT (OUTPATIENT)
Dept: PHYSICAL THERAPY | Facility: CLINIC | Age: 39
End: 2024-05-10
Payer: COMMERCIAL

## 2024-05-10 DIAGNOSIS — M54.50 MIDLINE LOW BACK PAIN: ICD-10-CM

## 2024-05-10 PROCEDURE — 97110 THERAPEUTIC EXERCISES: CPT | Mod: GP | Performed by: PHYSICAL THERAPIST

## 2024-05-10 ASSESSMENT — PAIN - FUNCTIONAL ASSESSMENT: PAIN_FUNCTIONAL_ASSESSMENT: 0-10

## 2024-05-10 ASSESSMENT — PAIN SCALES - GENERAL: PAINLEVEL_OUTOF10: 2

## 2024-05-10 NOTE — PROGRESS NOTES
Physical Therapy    Physical Therapy Treatment    Patient Name: Miguelina Gloria  MRN: [id number]  :1985  Today's Date: 5/10/2024  Time Calculation  Start Time: 1115  Stop Time: 1215  Time Calculation (min): 60 min  PT Therapeutic Procedures Time Entry  Therapeutic Exercise Time Entry: 50  PT Modalities Time Entry  Hot/Cold Pack Time Entry: 10        Visit: 8  Visit limit: Visits based on medical necessity    Assessment:   Pain levels are decreasing with functional activities since starting PT. Lumbar ROM and strength is improving. Pt needs continued PT to further improve trunk strength.    Plan:   Continue with trunk and lower extremity flexibility and strengthening program progressing as tolerated to decrease pain with standing and walking activities.     Patient RTD as needed.     Current Problem  1. Midline low back pain  Follow Up In Physical Therapy              Subjective    Pt reports that her back pain is not too bad this morning.        Precautions  Precautions  Precautions Comment: POTS    Pain  Pain Assessment  Pain Assessment: 0-10  Pain Score: 2  Pain Location: Back    Objective   Lumbar ROM:  Flexion: 80 degrees  extension: 14 degrees    LE strength:  4+/5 bilat throughout  Special tests:    Outcome Measures:       Treatments:  EXERCISES Date   2024 Date   2024 Date 24 Date 5/10/24    Visit 5 Visit  6 Visit: 7 Visit:8          Trunk flexion with ball 10 x each  10 x each 10X 10X   Mid row Blue x 20 Black x 20 Black X 20 Black X 20   Pull downs Blue x 20 Black x 20 Black X 20 Black X 20   Skis Blue x 20 Black x 20 Black X 20 Black X 20   Lateral walkouts              NuStep L5 10 minutes L5 10 minutes L5  10 mins L5  10 mins          Shuttle DLP 6B 3 x 15 6B 3 x 20 6B  3 X 20 6B  3 X 20   Shuttle DTR 6B 3 x 15 6B 3 x 20 6B  3 X 20 6B  3 X 20   Shuttle SLP 4B 3 x 15 4B 3 x 20 4B   3 X 20 4B  3 X 20          Back extension 60# 3 x 20 80# 3 x 15 80#  3 X 15 80#  3 X 15          Multi  hip flexion  60# 2 x 10 60# x 25 60#  X 25 60#  X 25   Multi hip abduction 60# 2 x 10 60# x 25 60#  X 25 60#  X 25                                                                                              HEP           OP EDUCATION:       Goals:    1. Improve thoracic and lumbar mobility so that patient can perform self care activities without difficulty or increased pain-3-4 weeks-goal met    2.Improve dynamic core strength so  that patient can tolerate standing > 15-20 minutes without increased back pain-4-6 weeks   05/6/2024 progressing    3. Pt will be able to sleep 4-6 hours without waking due to back pain-4-6 weeks-5/6/24 progressing    4.Improve Modified CASSIUS score < 30% to facilitate return to prior level of function-6-8 weeks-5/6/24 progressing    5.Pt will be independent with HEP-8 weeks

## 2024-05-14 ENCOUNTER — TREATMENT (OUTPATIENT)
Dept: PHYSICAL THERAPY | Facility: CLINIC | Age: 39
End: 2024-05-14
Payer: COMMERCIAL

## 2024-05-14 DIAGNOSIS — M54.50 MIDLINE LOW BACK PAIN: ICD-10-CM

## 2024-05-14 PROCEDURE — 97110 THERAPEUTIC EXERCISES: CPT | Mod: GP,CQ | Performed by: PHYSICAL THERAPY ASSISTANT

## 2024-05-14 ASSESSMENT — PAIN SCALES - GENERAL: PAINLEVEL_OUTOF10: 2

## 2024-05-14 ASSESSMENT — PAIN - FUNCTIONAL ASSESSMENT: PAIN_FUNCTIONAL_ASSESSMENT: 0-10

## 2024-05-14 NOTE — PROGRESS NOTES
Physical Therapy    Physical Therapy Treatment    Patient Name: Miguelina Gloria  MRN: [id number]  Today's Date: 5/14/2024  Time Calculation  Start Time: 1117  Stop Time: 1215  Time Calculation (min): 58 min    PT Therapeutic Procedures Time Entry  Therapeutic Exercise Time Entry: 48  PT Modalities Time Entry  Hot/Cold Pack Time Entry: 10     VISIT# 9    Current Problem  1. Midline low back pain  Follow Up In Physical Therapy          Subjective      Pt reports back is not to bad although still impacts her sleep.  Precautions  Precautions  Precautions Comment: POTS       Pain  Pain Assessment: 0-10  Pain Score: 2  Pain Location: Back    Objective:       Treatments:  EXERCISES Date   04/26/2024 Date   04/30/2024 Date 5/6/24 Date 5/10/24 Date 5/13/24    Visit 5 Visit  6 Visit: 7 Visit:8 #9           Trunk flexion with ball 10 x each  10 x each 10X 10X 10x   Mid row Blue x 20 Black x 20 Black X 20 Black X 20 Black x 30   Pull downs Blue x 20 Black x 20 Black X 20 Black X 20 Black x 30   Skis Blue x 20 Black x 20 Black X 20 Black X 20 Black x 30   Lateral walkouts                NuStep L5 10 minutes L5 10 minutes L5  10 mins L5  10 mins L5 x 10 mins           Shuttle DLP 6B 3 x 15 6B 3 x 20 6B  3 X 20 6B  3 X 20 6B 3 x 20   Shuttle DTR 6B 3 x 15 6B 3 x 20 6B  3 X 20 6B  3 X 20 6B 3 x 20   Shuttle SLP 4B 3 x 15 4B 3 x 20 4B   3 X 20 4B  3 X 20 4B 3 x 20           Back extension 60# 3 x 20 80# 3 x 15 80#  3 X 15 80#  3 X 15 80# 3 x 15 Inc next           Multi hip flexion  60# 2 x 10 60# x 25 60#  X 25 60#  X 25 60# x 25   Multi hip abduction 60# 2 x 10 60# x 25 60#  X 25 60#  X 25 60# x 25                                                                                                   Ice     10'   HEP             Assessment:    Pt had no reports of increased symptoms with current program.   OP EDUCATION:       Plan:    Continue with trunk and lower extremity flexibility and strengthening program progressing as tolerated to  decrease pain with standing and walking activities.      Goals:    1. Improve thoracic and lumbar mobility so that patient can perform self care activities without difficulty or increased pain-3-4 weeks-goal met    2.Improve dynamic core strength so  that patient can tolerate standing > 15-20 minutes without increased back pain-4-6 weeks   05/6/2024 progressing    3. Pt will be able to sleep 4-6 hours without waking due to back pain-4-6 weeks-5/6/24 progressing    4.Improve Modified CASSIUS score < 30% to facilitate return to prior level of function-6-8 weeks-5/6/24 progressing    5.Pt will be independent with HEP-8 weeks

## 2024-05-16 ENCOUNTER — SPECIALTY PHARMACY (OUTPATIENT)
Dept: PHARMACY | Facility: CLINIC | Age: 39
End: 2024-05-16

## 2024-05-17 ENCOUNTER — TREATMENT (OUTPATIENT)
Dept: PHYSICAL THERAPY | Facility: CLINIC | Age: 39
End: 2024-05-17
Payer: COMMERCIAL

## 2024-05-17 ENCOUNTER — DOCUMENTATION (OUTPATIENT)
Dept: PHYSICAL THERAPY | Facility: CLINIC | Age: 39
End: 2024-05-17

## 2024-05-17 DIAGNOSIS — M54.9 UPPER BACK PAIN: Primary | ICD-10-CM

## 2024-05-17 DIAGNOSIS — M54.50 MIDLINE LOW BACK PAIN: ICD-10-CM

## 2024-05-17 PROCEDURE — 97110 THERAPEUTIC EXERCISES: CPT | Mod: GP,CQ

## 2024-05-17 ASSESSMENT — PAIN DESCRIPTION - DESCRIPTORS: DESCRIPTORS: ACHING;TIGHTNESS

## 2024-05-17 ASSESSMENT — PAIN SCALES - GENERAL: PAINLEVEL_OUTOF10: 2

## 2024-05-17 ASSESSMENT — PAIN - FUNCTIONAL ASSESSMENT: PAIN_FUNCTIONAL_ASSESSMENT: 0-10

## 2024-05-17 NOTE — PROGRESS NOTES
Physical Therapy    Physical Therapy Treatment    Patient Name: Miguelina Gloria  MRN: [id number]  : 1985  Today's Date: 2024  Time Calculation  Start Time: 0800  Stop Time: 0850  Time Calculation (min): 50 min      Visit:  10   Visit limit: Visits based on medical necessity/    Assessment:   Patient performed exercises without complaints of increased lumbar symptoms. Patient reported decreased lumbar tightness with standing and walking activities after treatment. Patient reports overall decreases in back pain with sit to stand movements and with initial weight bearing activities.       Plan:   Continue with trunk and lower extremity flexibility and strengthening program progressing as tolerated to decrease pain with standing and walking activities.     Patient scheduled for a reassessment on 2024.  Patient RTD as needed.     Current Problem  1. Upper back pain        2. Midline low back pain  Follow Up In Physical Therapy          Subjective    Patient reports mild soreness in back this morning.     Precautions  Precautions  Precautions Comment: POTS    Pain  Pain Assessment  Pain Assessment: 0-10  Pain Score: 2  Pain Location: Back  Pain Orientation: Mid, Lower  Pain Descriptors: Aching, Tightness    Objective   Trunk strength  Fair+    Outcome Measures:  Other Measures  Oswestry Disablity Index (CASSIUS): 23 (raw score) (Score at initial evaluation)    Treatments:  EXERCISES Date 2024 Date   Date  Date Date    Visit  10 Visit   Visit:  Visit:            Trunk flexion with ball 10 x each        Mid row Silver x 30       Pull downs Silver x 30       Skis Silver x 30       Lateral walkouts                NuStep L5 10 minutes               Shuttle DLP 6B 3 x 20       Shuttle DTR 6B 3 x 20       Shuttle SLP 4B 3 x 20               Back extension 80# 3 x 20               Multi hip flexion  70# x 25       Multi hip abduction 70# x 25                                                                                                        Ice        HEP            OP EDUCATION:       Goals:  1. Improve thoracic and lumbar mobility so that patient can perform self care activities without difficulty or increased pain-3-4 weeks-goal met    2.Improve dynamic core strength so  that patient can tolerate standing > 15-20 minutes without increased back pain-4-6 weeks   05/17/2024 progressing    3. Pt will be able to sleep 4-6 hours without waking due to back pain-4-6 weeks-5/6/24 progressing    4.Improve Modified CASSIUS score < 30% to facilitate return to prior level of function-6-8 weeks-5/6/24 progressing    5.Pt will be independent with HEP-8 weeks

## 2024-05-18 NOTE — PROGRESS NOTES
Subjective   Patient ID: Miguelina Gloria is a 38 y.o. adult who presents for No chief complaint on file..    HPI  Problems  Virtual appt video today  Assessed    · Hypermobility syndrome (728.5) (M35.7)   · Chronic pain of multiple sites (780.96,338.29) (R52,G89.29)   · Fibromyalgia (729.1) (M79.7)   · POTS (postural orthostatic tachycardia syndrome) (427.89) (G90.A)     Pain much improved with Duloxitine 60 mg daily  No other issues     Last seen July 2023        ROS      Current Outpatient Medications   Medication Sig Dispense Refill    amitriptyline (Elavil) 10 mg tablet TAKE 1 TABLET BY MOUTH EVERYDAY AT BEDTIME 90 tablet 2    azelastine (Astelin) 137 mcg (0.1 %) nasal spray USE 1 SRPAY EACH NOSTRIL DAILY AS DIRECTED      calcium carbonate-vitamin D3 600 mg-20 mcg (800 unit) tablet Take by mouth.      DULoxetine (Cymbalta) 30 mg DR capsule Take 2 capsules (60 mg) by mouth once daily. 120 capsule 1    famotidine (Pepcid) 20 mg tablet Take 1 tablet (20 mg) by mouth 2 times a day as needed.      ferrous fumarate/ascorbic acid (FERROUS FUMARATE-VITAMIN C ORAL) Take 1 tablet by mouth once daily.      ferrous sulfate 325 (65 Fe) MG tablet Take 1 tablet by mouth 2 times a day.      fexofenadine-pseudoephedrine (Allegra-D 24 Hour) 180-240 mg 24 hr tablet Take 1 tablet by mouth once daily.      FIBER, CALCIUM POLYCARBOPHIL, ORAL Take 5 g by mouth once daily.      galcanezumab (Emgality Pen) 120 mg/mL auto-injector Inject 1 Syringe (120 mg) under the skin every 28 (twenty-eight) days. Loading dose of 240mg x 1 then 120mg subcutaneous every month thereafter 1 each 5    ibuprofen 600 mg tablet Take by mouth 3 times a day as needed.      L.acid,ferm,nichelle,rha-B.bif,long (Controlled Delivery Probiotic) 126 mg (2 billion cell) tablet,delayed and ext.release Take 1 tablet by mouth once daily.      lactobacillus acidophilus capsule Take 1 capsule by mouth once daily.      Linzess 145 mcg capsule Take 1 capsule (145 mcg) by mouth  once daily.      magnesium 250 mg tablet Magnesium 250 MG Oral Tablet  Refills: 0  Start: 19-Apr-2023      multivit-minerals/folic acid (CENTRUM ADULTS ORAL) Take 1 tablet by mouth once daily.      multivit-mins no.11-folic acid (Dialyvite 5000) 5 mg tablet Take by mouth once daily.      olopatadine-mometasone (Ryaltris) 665-25 mcg/spray spray,non-aerosol Administer 2 sprays into affected nostril(s) once daily. Use 2 sprays to each nostril once daily. 29 g 11    propranolol (Inderal) 20 mg tablet Take 1 tablet (20 mg) by mouth 3 times a day. 90 tablet 5    rimegepant (Nurtec ODT) 75 mg tablet,disintegrating Alow one (1) tablet to dissolve on or under the tongue once daily as needed for migraine. Do not take more than one (1) tablet in a 24 hour period. 16 tablet 3    triamcinolone (Kenalog) 0.1 % ointment Apply 1 Application topically 2 times a day.      triamcinolone (Nasacort) 55 mcg nasal inhaler Nasal Allergy 24 Hour 55 MCG/ACT Nasal Aerosol  Refills: 0       No current facility-administered medications for this visit.         has a past medical history of Impacted cerumen, bilateral (10/26/2020), Migraine, unspecified, not intractable, without status migrainosus, Otalgia, bilateral (11/02/2020), Other allergy status, other than to drugs and biological substances, Other conditions influencing health status, Personal history of diseases of the blood and blood-forming organs and certain disorders involving the immune mechanism, Personal history of other diseases of the digestive system (11/20/2020), Personal history of other diseases of the female genital tract (05/13/2019), and Personal history of other diseases of the nervous system and sense organs.   Past Surgical History:   Procedure Laterality Date    OTHER SURGICAL HISTORY  12/14/2020    No history of surgery      Social History     Tobacco Use    Smoking status: Never    Smokeless tobacco: Never   Vaping Use    Vaping status: Never Used   Substance Use  Topics    Alcohol use: Never    Drug use: Never      family history includes Diabetes in an other family member; Eczema in Miguelina's mother; Heart disease in Miguelina's cousin and another family member; Hypertension in an other family member; Lung cancer in Miguelina's father; Stroke in Miguelina's father and another family member; environmental allergies in an other family member.  Pain Management Panel           No data to display                 There were no vitals filed for this visit.  Lab Results   Component Value Date    RF <10 06/19/2023    SEDRATE 68 (H) 06/19/2023    CRP 1.38 (A) 06/19/2023    TSH 0.97 09/12/2022       PHYSICAL EXAM      NODES   HEART  LUNGS  ABDOMEN   VASCULAR  NEURO   SKIN  JOINTS     PLAN  Diagnoses and all orders for this visit:  POTS (postural orthostatic tachycardia syndrome)  Hypermobility syndrome  Fibromyalgia  Chronic pain of multiple sites    Doing ok with Duloxitine  No other cos  Med refilled  See in 1 year

## 2024-05-20 ENCOUNTER — TELEMEDICINE (OUTPATIENT)
Dept: RHEUMATOLOGY | Facility: CLINIC | Age: 39
End: 2024-05-20
Payer: COMMERCIAL

## 2024-05-20 ENCOUNTER — TREATMENT (OUTPATIENT)
Dept: PHYSICAL THERAPY | Facility: CLINIC | Age: 39
End: 2024-05-20
Payer: COMMERCIAL

## 2024-05-20 DIAGNOSIS — G89.29 CHRONIC PAIN OF MULTIPLE SITES: ICD-10-CM

## 2024-05-20 DIAGNOSIS — M54.50 MIDLINE LOW BACK PAIN: ICD-10-CM

## 2024-05-20 DIAGNOSIS — M79.7 FIBROMYALGIA: ICD-10-CM

## 2024-05-20 DIAGNOSIS — M54.9 UPPER BACK PAIN: Primary | ICD-10-CM

## 2024-05-20 DIAGNOSIS — M35.7 HYPERMOBILITY SYNDROME: ICD-10-CM

## 2024-05-20 DIAGNOSIS — R52 CHRONIC PAIN OF MULTIPLE SITES: ICD-10-CM

## 2024-05-20 DIAGNOSIS — G90.A POTS (POSTURAL ORTHOSTATIC TACHYCARDIA SYNDROME): Primary | ICD-10-CM

## 2024-05-20 PROCEDURE — 97110 THERAPEUTIC EXERCISES: CPT | Mod: GP,CQ

## 2024-05-20 PROCEDURE — 99213 OFFICE O/P EST LOW 20 MIN: CPT | Performed by: INTERNAL MEDICINE

## 2024-05-20 RX ORDER — DULOXETIN HYDROCHLORIDE 60 MG/1
60 CAPSULE, DELAYED RELEASE ORAL DAILY
Qty: 90 CAPSULE | Refills: 3 | Status: SHIPPED | OUTPATIENT
Start: 2024-05-20

## 2024-05-20 ASSESSMENT — PAIN - FUNCTIONAL ASSESSMENT: PAIN_FUNCTIONAL_ASSESSMENT: 0-10

## 2024-05-20 ASSESSMENT — PAIN DESCRIPTION - DESCRIPTORS: DESCRIPTORS: ACHING;DULL;SORE;TIGHTNESS

## 2024-05-20 ASSESSMENT — PAIN SCALES - GENERAL: PAINLEVEL_OUTOF10: 6

## 2024-05-20 NOTE — PROGRESS NOTES
Physical Therapy    Physical Therapy Treatment    Patient Name: Miguelina Gloria  MRN: [id number]  : 1985  Today's Date: 2024  Time Calculation  Start Time: 1145  Stop Time: 1240  Time Calculation (min): 55 min      Visit:  11   Visit limit: Visits based on medical necessity/    Assessment:   Patient performed exercises without complaints of increased lumbar symptoms. Patient reported decreased lumbar pain and tightness with transfers and walking activities after treatment with a pain rating of  3-4/10.       Plan:   Continue with trunk and lower extremity flexibility and strengthening program progressing as tolerated to decrease pain with standing and walking activities.     Patient scheduled for a reassessment on 2024.  Patient RTD as needed.     Current Problem  1. Upper back pain        2. Midline low back pain  Follow Up In Physical Therapy          Subjective    Patient reports woke up this morning with back being sore for reasons     Precautions  Precautions  Precautions Comment: POTS    Pain  Pain Assessment  Pain Assessment: 0-10  Pain Score: 6  Pain Location: Back  Pain Orientation: Lower, Mid  Pain Descriptors: Aching, Dull, Sore, Tightness    Objective   Trunk strength  Fair+    Outcome Measures:  Other Measures  Oswestry Disablity Index (CASSIUS): 23 (raw score) (Score at intiial evaluation)    Treatments:  EXERCISES Date 2024 Date  2024 Date  Date Date    Visit  10 Visit  11 Visit:  Visit:            Trunk flexion with ball 3 way 10 x each  10 x each direction      Mid row Silver x 30 Silver x 30      Pull downs Silver x 30 Silver x 30      Skis Silver x 30 Silver x 30      Lateral walkouts                NuStep L5 10 minutes L5 10 minutes              Shuttle DLP 6B 3 x 20 6B 3 x 20      Shuttle DTR 6B 3 x 20 6B 3 x 20      Shuttle SLP 4B 3 x 20 4B 3 x 20              Back extension 80# 3 x 20 80# 3 x 20              Multi hip flexion  70# x 25 70# x 25      Multi hip abduction  70# x 25 70# x 25                                                                                                      Ice  10 minutes      HEP            OP EDUCATION:       Goals:  1. Improve thoracic and lumbar mobility so that patient can perform self care activities without difficulty or increased pain-3-4 weeks- goal met    2.Improve dynamic core strength so  that patient can tolerate standing > 15-20 minutes without increased back pain-4-6 weeks   05/20/2024 progressing    3. Pt will be able to sleep 4-6 hours without waking due to back pain-4-6 weeks-5/6/24 progressing    4.Improve Modified CASSIUS score < 30% to facilitate return to prior level of function-6-8 weeks-5/6/24 progressing    5.Pt will be independent with HEP-8 weeks   05/20/2024 progressing

## 2024-05-22 ENCOUNTER — TREATMENT (OUTPATIENT)
Dept: PHYSICAL THERAPY | Facility: CLINIC | Age: 39
End: 2024-05-22
Payer: COMMERCIAL

## 2024-05-22 ENCOUNTER — SPECIALTY PHARMACY (OUTPATIENT)
Dept: PHARMACY | Facility: CLINIC | Age: 39
End: 2024-05-22

## 2024-05-22 DIAGNOSIS — M54.9 UPPER BACK PAIN: Primary | ICD-10-CM

## 2024-05-22 DIAGNOSIS — M54.50 MIDLINE LOW BACK PAIN: ICD-10-CM

## 2024-05-22 PROCEDURE — 97110 THERAPEUTIC EXERCISES: CPT | Mod: GP,CQ

## 2024-05-22 PROCEDURE — RXMED WILLOW AMBULATORY MEDICATION CHARGE

## 2024-05-22 ASSESSMENT — PAIN DESCRIPTION - DESCRIPTORS: DESCRIPTORS: ACHING;DULL;SORE;TIGHTNESS

## 2024-05-22 ASSESSMENT — PAIN SCALES - GENERAL: PAINLEVEL_OUTOF10: 5 - MODERATE PAIN

## 2024-05-22 ASSESSMENT — PAIN - FUNCTIONAL ASSESSMENT: PAIN_FUNCTIONAL_ASSESSMENT: 0-10

## 2024-05-22 NOTE — PROGRESS NOTES
Physical Therapy    Physical Therapy Treatment    Patient Name: Miguelina Gloria  MRN: [id number]  : 1985  Today's Date: 2024  Time Calculation  Start Time: 0800  Stop Time: 0850  Time Calculation (min): 50 min      Visit:  12   Visit limit: Visits based on medical necessity    Assessment:   Patient performed exercises without complaints of increased lumbar symptoms. Patient reports overall flexibility and strength levels have improved however continues to experience pain with daily and work activities.     Plan:   Continue with trunk and lower extremity flexibility and strengthening program progressing as tolerated to decrease pain with standing and walking activities.     Patient scheduled for a reassessment on 2024.  Patient RTD as needed.     Current Problem  1. Upper back pain        2. Midline low back pain  Follow Up In Physical Therapy          Subjective    Patient reports continued back soreness with daily and work activities.     Precautions  Precautions  Precautions Comment: POTS    Pain  Pain Assessment  Pain Assessment: 0-10  Pain Score: 5 - Moderate pain  Pain Location: Back  Pain Orientation: Lower, Mid  Pain Descriptors: Aching, Dull, Sore, Tightness    Objective   Trunk strength  Fair+    Trunk AROM  WFL    Outcome Measures:       Treatments:  EXERCISES Date 2024 Date  2024 Date  2024 Date Date    Visit  10 Visit  11 Visit:  12 Visit:            Trunk flexion with ball 3 way 10 x each  10 x each direction 10 x each direction     Mid row Silver x 30 Silver x 30 Silver x 30     Pull downs Silver x 30 Silver x 30 Silver x 30     Skis Silver x 30 Silver x 30 Silver x 30     Lateral walkouts                NuStep L5 10 minutes L5 10 minutes L5  10 minutes             Shuttle DLP 6B 3 x 20 6B 3 x 20 7B 3 x 20     Shuttle DTR 6B 3 x 20 6B 3 x 20 7B 3 x 20     Shuttle SLP 4B 3 x 20 4B 3 x 20 5B 3 x 20             Back extension 80# 3 x 20 80# 3 x 20 80# 3 x 20              Multi hip flexion  70# x 25 70# x 25 70# x 25     Multi hip abduction 70# x 25 70# x 25 70# x 25                                                                                                     Ice  10 minutes 10 minutes     HEP            OP EDUCATION:       Goals:  1. Improve thoracic and lumbar mobility so that patient can perform self care activities without difficulty or increased pain-3-4 weeks- goal met    2.Improve dynamic core strength so  that patient can tolerate standing > 15-20 minutes without increased back pain-4-6 weeks   05/22/2024 progressing    3. Pt will be able to sleep 4-6 hours without waking due to back pain-4-6 weeks-5/6/24 progressing    4.Improve Modified CASSIUS score < 30% to facilitate return to prior level of function-6-8 weeks-5/6/24 progressing    5.Pt will be independent with HEP-8 weeks   05/20/2024 progressing

## 2024-05-24 ENCOUNTER — PHARMACY VISIT (OUTPATIENT)
Dept: PHARMACY | Facility: CLINIC | Age: 39
End: 2024-05-24
Payer: MEDICARE

## 2024-05-28 ENCOUNTER — TREATMENT (OUTPATIENT)
Dept: PHYSICAL THERAPY | Facility: CLINIC | Age: 39
End: 2024-05-28
Payer: COMMERCIAL

## 2024-05-28 DIAGNOSIS — M54.50 MIDLINE LOW BACK PAIN: ICD-10-CM

## 2024-05-28 PROCEDURE — 97110 THERAPEUTIC EXERCISES: CPT | Mod: GP,CQ | Performed by: PHYSICAL THERAPY ASSISTANT

## 2024-05-28 ASSESSMENT — PAIN DESCRIPTION - DESCRIPTORS: DESCRIPTORS: ACHING;DULL;SORE

## 2024-05-28 ASSESSMENT — PAIN - FUNCTIONAL ASSESSMENT: PAIN_FUNCTIONAL_ASSESSMENT: 0-10

## 2024-05-28 ASSESSMENT — PAIN SCALES - GENERAL: PAINLEVEL_OUTOF10: 4

## 2024-05-28 NOTE — PROGRESS NOTES
"Physical Therapy    Physical Therapy Treatment    Patient Name: Miguelina Gloria  MRN: [id number]  : 1985  Today's Date: 2024  Time Calculation  Start Time: 0800  Stop Time: 0900  Time Calculation (min): 60 min      Visit:  13   Visit limit: Visits based on medical necessity    Assessment:   Patient performed exercises without complaints of increased lumbar symptoms and reduced pain post session. Patient reports today \"was a tough day.  Plan:   Continue with trunk and lower extremity flexibility and strengthening program progressing as tolerated to decrease pain with standing and walking activities.     Patient scheduled for a reassessment on 2024.  Patient RTD as needed.     Current Problem  1. Midline low back pain  Follow Up In Physical Therapy          Subjective    Patient reports continued back soreness with daily and work activities.     Precautions  Precautions  Precautions Comment: POTS    Pain  Pain Assessment  Pain Assessment: 0-10  Pain Score: 4  Pain Location: Back  Pain Orientation: Lower, Mid  Pain Descriptors: Aching, Dull, Sore    Objective   Trunk strength  Fair+    Trunk AROM  WFL    Outcome Measures:       Treatments:  EXERCISES Date 2024 Date  2024 Date  2024 Date 24 Date    Visit  10 Visit  11 Visit:  12 Visit: 13            Trunk flexion with ball 3 way 10 x each  10 x each direction 10 x each direction 10 x each direction    Mid row Silver x 30 Silver x 30 Silver x 30 Silver x 30    Pull downs Silver x 30 Silver x 30 Silver x 30 Silver x 30    Skis Silver x 30 Silver x 30 Silver x 30 Silver x 30    Lateral walkouts                NuStep L5 10 minutes L5 10 minutes L5  10 minutes L6 x 10 min            Shuttle DLP 6B 3 x 20 6B 3 x 20 7B 3 x 20 7B 3 x 20    Shuttle DTR 6B 3 x 20 6B 3 x 20 7B 3 x 20 7B 3 x 20    Shuttle SLP 4B 3 x 20 4B 3 x 20 5B 3 x 20 5B 3 x 20            Back extension 80# 3 x 20 80# 3 x 20 80# 3 x 20 80# 3 x 20            Multi hip " flexion  70# x 25 70# x 25 70# x 25 70# x 25    Multi hip abduction 70# x 25 70# x 25 70# x 25 70# x 25                                                                                                    Ice  10 minutes 10 minutes 10 min    HEP            OP EDUCATION:       Goals:  1. Improve thoracic and lumbar mobility so that patient can perform self care activities without difficulty or increased pain-3-4 weeks- goal met    2.Improve dynamic core strength so  that patient can tolerate standing > 15-20 minutes without increased back pain-4-6 weeks   05/22/2024 progressing    3. Pt will be able to sleep 4-6 hours without waking due to back pain-4-6 weeks-5/6/24 progressing    4.Improve Modified CASSIUS score < 30% to facilitate return to prior level of function-6-8 weeks-5/6/24 progressing    5.Pt will be independent with HEP-8 weeks   05/20/2024 progressing

## 2024-05-30 ENCOUNTER — TREATMENT (OUTPATIENT)
Dept: PHYSICAL THERAPY | Facility: CLINIC | Age: 39
End: 2024-05-30
Payer: COMMERCIAL

## 2024-05-30 DIAGNOSIS — M54.50 MIDLINE LOW BACK PAIN: Primary | ICD-10-CM

## 2024-05-30 PROCEDURE — 97110 THERAPEUTIC EXERCISES: CPT | Mod: GP,CQ

## 2024-05-30 ASSESSMENT — PAIN DESCRIPTION - DESCRIPTORS: DESCRIPTORS: ACHING;DULL;SORE

## 2024-05-30 ASSESSMENT — PAIN SCALES - GENERAL: PAINLEVEL_OUTOF10: 4

## 2024-05-30 ASSESSMENT — PAIN - FUNCTIONAL ASSESSMENT: PAIN_FUNCTIONAL_ASSESSMENT: 0-10

## 2024-05-30 NOTE — PROGRESS NOTES
Physical Therapy    Physical Therapy Treatment    Patient Name: Miguelina Gloria  MRN: [id number]  : 1985  Today's Date: 2024  Time Calculation  Start Time: 553  Stop Time: 642  Time Calculation (min): 49 min      Visit:  14   Visit limit: Visits based on medical necessity    Assessment:   Patient completed exercise program without an increase in back or knee pain. Maintained exercise program secondary to increased symptoms the past week.  Pt having difficulty standing for long periods of time ie. doing dishes, work tasks etc Mid back pain increases to 8-9/10.    Plan:   Continue with trunk and lower extremity flexibility and strengthening program progressing as tolerated to decrease pain with standing and walking activities.     Patient scheduled for a reassessment on 2024.  Patient RTD as needed.     Current Problem  1. Midline low back pain              Subjective    Patient reports that she has had increased mid back pain since last week. Unsure of the cause. Reports that she has needed to take Acetaminophen most days to manage her pain. She is not sleeping well.   C/O left knee pain that worsened since her back pain increased.    Precautions   Precautions  Precautions Comment: POTS    Pain  Pain Assessment  Pain Assessment: 0-10  Pain Score: 4  Pain Location: Back  Pain Orientation: Mid  Pain Descriptors: Aching, Dull, Sore    Objective   Trunk strength  Fair+    Trunk AROM  WFL    Outcome Measures:   Not today    Treatments:  EXERCISES Date 2024 Date  2024 Date  2024 Date 24 Date 24    Visit  10 Visit  11 Visit:  12 Visit: 13 Visit: 14           Trunk flexion with ball 3 way 10 x each  10 x each direction 10 x each direction 10 x each direction    Mid row Silver x 30 Silver x 30 Silver x 30 Silver x 30 Silver 30x   Pull downs Silver x 30 Silver x 30 Silver x 30 Silver x 30 Silver 30x   Skis Silver x 30 Silver x 30 Silver x 30 Silver x 30 Silver 30x   Lateral  walkouts                NuStep L5 10 minutes L5 10 minutes L5  10 minutes L6 x 10 min L6 5 min           Shuttle DLP 6B 3 x 20 6B 3 x 20 7B 3 x 20 7B 3 x 20 7B 3x20   Shuttle DTR 6B 3 x 20 6B 3 x 20 7B 3 x 20 7B 3 x 20 7B 3x20   Shuttle SLP 4B 3 x 20 4B 3 x 20 5B 3 x 20 5B 3 x 20 4B 3x20           Back extension 80# 3 x 20 80# 3 x 20 80# 3 x 20 80# 3 x 20 80# 3x20           Multi hip flexion  70# x 25 70# x 25 70# x 25 70# x 25 70# x25   Multi hip abduction 70# x 25 70# x 25 70# x 25 70# x 25 70# x25                                                                                                   Ice  10 minutes 10 minutes 10 min    HEP             Goals:  1. Improve thoracic and lumbar mobility so that patient can perform self care activities without difficulty or increased pain-3-4 weeks- goal met    2.Improve dynamic core strength so  that patient can tolerate standing > 15-20 minutes without increased back pain-4-6 weeks   05/22/2024 progressing    3. Pt will be able to sleep 4-6 hours without waking due to back pain-4-6 weeks-5/6/24 progressing    4.Improve Modified CASSIUS score < 30% to facilitate return to prior level of function-6-8 weeks-5/6/24 progressing    5.Pt will be independent with HEP-8 weeks   05/20/2024 progressing

## 2024-06-05 ENCOUNTER — TREATMENT (OUTPATIENT)
Dept: PHYSICAL THERAPY | Facility: CLINIC | Age: 39
End: 2024-06-05
Payer: COMMERCIAL

## 2024-06-05 DIAGNOSIS — M54.50 MIDLINE LOW BACK PAIN: ICD-10-CM

## 2024-06-05 PROCEDURE — 97014 ELECTRIC STIMULATION THERAPY: CPT | Mod: GP | Performed by: PHYSICAL THERAPIST

## 2024-06-05 PROCEDURE — 97110 THERAPEUTIC EXERCISES: CPT | Mod: GP | Performed by: PHYSICAL THERAPIST

## 2024-06-05 ASSESSMENT — PAIN - FUNCTIONAL ASSESSMENT: PAIN_FUNCTIONAL_ASSESSMENT: 0-10

## 2024-06-05 NOTE — PROGRESS NOTES
Physical Therapy    Physical Therapy Treatment/Reassessment    Patient Name: Miguelina Gloria  MRN: [id number]  : 1985  Today's Date: 2024  Time Calculation  Start Time: 415  Stop Time: 458  Time Calculation (min): 43 min      Visit:  15   Visit limit: Visits based on medical necessity    Assessment:  Pt has had flare up of pain over the past two weeks-unknown cause. Pain is limiting functional activities and ability to sleep. Pt will contact MD regarding flare up pain and will hold PT until she communicates with MD.    Plan:   Hold PT until speaks with MD due to flare up of pain. May benefit from further diagnostic testing.      Patient RTD as needed.     Current Problem  1. Midline low back pain  Follow Up In Physical Therapy              Subjective    Patient reports that she has had increased mid back pain for past two weeks. Unsure of the cause. Reports that she has needed to take Acetaminophen most days to manage her pain. She is not sleeping well. Pain extends from lower back up to upper thoracic area. Pain level today is 5-6/10.      Precautions  Precautions  Precautions Comment: POTSPrecautions  Precautions Comment: POTS    Pain  Pain Assessment  Pain Assessment: 0-10  Pain Location: Back    Objective   Trunk strength  Fair+    Trunk AROM  Flexion: 60  Extension: 8    Outcome Measures:  Other Measures  Oswestry Disablity Index (CASSIUS): 33(raw score)    Treatments:  EXERCISES Date 2024 Date  2024 Date  2024 Date 24 Date 24    Visit  10 Visit  11 Visit:  12 Visit: 13 Visit: 14           Trunk flexion with ball 3 way 10 x each  10 x each direction 10 x each direction 10 x each direction    Mid row Silver x 30 Silver x 30 Silver x 30 Silver x 30 Silver 30x   Pull downs Silver x 30 Silver x 30 Silver x 30 Silver x 30 Silver 30x   Skis Silver x 30 Silver x 30 Silver x 30 Silver x 30 Silver 30x   Lateral walkouts                NuStep L5 10 minutes L5 10 minutes L5  10 minutes  L6 x 10 min L6 5 min           Shuttle DLP 6B 3 x 20 6B 3 x 20 7B 3 x 20 7B 3 x 20 7B 3x20   Shuttle DTR 6B 3 x 20 6B 3 x 20 7B 3 x 20 7B 3 x 20 7B 3x20   Shuttle SLP 4B 3 x 20 4B 3 x 20 5B 3 x 20 5B 3 x 20 4B 3x20           Back extension 80# 3 x 20 80# 3 x 20 80# 3 x 20 80# 3 x 20 80# 3x20           Multi hip flexion  70# x 25 70# x 25 70# x 25 70# x 25 70# x25   Multi hip abduction 70# x 25 70# x 25 70# x 25 70# x 25 70# x25                                                                                                   Ice  10 minutes 10 minutes 10 min    HEP             Goals:  1. Improve thoracic and lumbar mobility so that patient can perform self care activities without difficulty or increased pain-3-4 weeks- goal met    2.Improve dynamic core strength so  that patient can tolerate standing > 15-20 minutes without increased back pain-4-6 weeks   06/5/2024 progressing    3. Pt will be able to sleep 4-6 hours without waking due to back pain-4-6 weeks-6/5/24 progressing    4.Improve Modified CASSIUS score < 30% to facilitate return to prior level of function-6-8 weeks-6/5/24 progressing    5.Pt will be independent with HEP-8 weeks   06/5/2024 progressing

## 2024-06-17 ENCOUNTER — SPECIALTY PHARMACY (OUTPATIENT)
Dept: PHARMACY | Facility: CLINIC | Age: 39
End: 2024-06-17

## 2024-06-18 ENCOUNTER — TELEMEDICINE (OUTPATIENT)
Dept: PRIMARY CARE | Facility: CLINIC | Age: 39
End: 2024-06-18
Payer: COMMERCIAL

## 2024-06-18 DIAGNOSIS — M54.50 ACUTE EXACERBATION OF CHRONIC LOW BACK PAIN: Primary | ICD-10-CM

## 2024-06-18 DIAGNOSIS — G89.29 ACUTE EXACERBATION OF CHRONIC LOW BACK PAIN: Primary | ICD-10-CM

## 2024-06-18 PROCEDURE — 99214 OFFICE O/P EST MOD 30 MIN: CPT | Performed by: FAMILY MEDICINE

## 2024-06-18 RX ORDER — CYCLOBENZAPRINE HCL 10 MG
10 TABLET ORAL NIGHTLY PRN
Qty: 10 TABLET | Refills: 0 | Status: SHIPPED | OUTPATIENT
Start: 2024-06-18 | End: 2024-08-17

## 2024-06-18 RX ORDER — METHYLPREDNISOLONE 4 MG/1
TABLET ORAL
Qty: 21 TABLET | Refills: 0 | Status: SHIPPED | OUTPATIENT
Start: 2024-06-18 | End: 2024-06-25

## 2024-06-18 NOTE — PROGRESS NOTES
Subjective   Patient ID: Miguelina Gloria is a 38 y.o. adult who presents for her low back pain.  HPI  he patient is a 39 yo AA female with a history of obesity- BMI 51, ocular migraines, POTS, here for the management of an exacerbation of her chronic back pain.  The patient states that it flared on June 5, 2024, when she had completed about 15 sessions of physical therapy.  She states that the pain is worse than it was when she started physical therapy.    A review of system was completed.  All systems were reviewed and were normal, except for the ones that are listed in the HPI.    Objective   Physical Exam    Assessment/Plan   Problem List Items Addressed This Visit       Acute exacerbation of chronic low back pain - Primary     -Etiology is unclear.  -MRI of the lumbar spine was ordered.  -Referral to pain management done.  -Medrol Dosepak and cyclobenzaprine 10 mg nightly as needed started.         Relevant Medications    methylPREDNISolone (Medrol Dospak) 4 mg tablets    cyclobenzaprine (Flexeril) 10 mg tablet    Other Relevant Orders    Referral to Pain Medicine    MR lumbar spine wo IV contrast    Patient to return to office

## 2024-06-18 NOTE — ASSESSMENT & PLAN NOTE
-Etiology is unclear.  -MRI of the lumbar spine was ordered.  -Referral to pain management done.  -Medrol Dosepak and cyclobenzaprine 10 mg nightly as needed started.

## 2024-06-27 ENCOUNTER — PATIENT MESSAGE (OUTPATIENT)
Dept: PRIMARY CARE | Facility: CLINIC | Age: 39
End: 2024-06-27
Payer: COMMERCIAL

## 2024-06-27 DIAGNOSIS — M54.9 UPPER BACK PAIN: Primary | ICD-10-CM

## 2024-06-30 DIAGNOSIS — K59.09 OTHER CONSTIPATION: ICD-10-CM

## 2024-07-01 PROCEDURE — RXMED WILLOW AMBULATORY MEDICATION CHARGE

## 2024-07-01 RX ORDER — LINACLOTIDE 145 UG/1
145 CAPSULE, GELATIN COATED ORAL DAILY
Qty: 90 CAPSULE | Refills: 2 | Status: SHIPPED | OUTPATIENT
Start: 2024-07-01

## 2024-07-10 ENCOUNTER — SPECIALTY PHARMACY (OUTPATIENT)
Dept: PHARMACY | Facility: CLINIC | Age: 39
End: 2024-07-10

## 2024-07-11 ENCOUNTER — APPOINTMENT (OUTPATIENT)
Dept: RADIOLOGY | Facility: CLINIC | Age: 39
End: 2024-07-11
Payer: COMMERCIAL

## 2024-07-11 ENCOUNTER — PHARMACY VISIT (OUTPATIENT)
Dept: PHARMACY | Facility: CLINIC | Age: 39
End: 2024-07-11
Payer: MEDICARE

## 2024-07-12 ENCOUNTER — TELEPHONE (OUTPATIENT)
Dept: PRIMARY CARE | Facility: CLINIC | Age: 39
End: 2024-07-12
Payer: COMMERCIAL

## 2024-07-19 ENCOUNTER — APPOINTMENT (OUTPATIENT)
Dept: RADIOLOGY | Facility: CLINIC | Age: 39
End: 2024-07-19
Payer: COMMERCIAL

## 2024-07-22 ENCOUNTER — APPOINTMENT (OUTPATIENT)
Dept: PAIN MEDICINE | Facility: CLINIC | Age: 39
End: 2024-07-22
Payer: COMMERCIAL

## 2024-07-22 ENCOUNTER — OFFICE VISIT (OUTPATIENT)
Dept: PAIN MEDICINE | Facility: CLINIC | Age: 39
End: 2024-07-22
Payer: COMMERCIAL

## 2024-07-22 DIAGNOSIS — M79.7 FIBROMYALGIA: Primary | ICD-10-CM

## 2024-07-22 DIAGNOSIS — G89.29 ACUTE EXACERBATION OF CHRONIC LOW BACK PAIN: ICD-10-CM

## 2024-07-22 DIAGNOSIS — M54.50 ACUTE EXACERBATION OF CHRONIC LOW BACK PAIN: ICD-10-CM

## 2024-07-22 PROCEDURE — 99204 OFFICE O/P NEW MOD 45 MIN: CPT | Performed by: PHYSICAL MEDICINE & REHABILITATION

## 2024-07-22 NOTE — PROGRESS NOTES
"Chief complaint  Pain in the upper and lower body    History  Miguelina Gloria is referred for initial pain management office visit  Has been having pain in the upper and lower body. She was having GI issues and NV and diarrhea that got worse in 2021 and the pain got worse. She is also diagnosed with POTS  Has been having the pain in the upper and lower body and upper and lower limbs   Having pain and burning. The pain is interfering with activities of daily living, quality of life and quality of sleep. It is limiting the functions and everything takes longer to complete because of the slowing related to the pain. Movements are cautious to avoid aggravation of the symptoms.   The pain burning and tingling on a continuous fashion. The pain goes in aggravation intermittently with sharp lancinating, electrical like jolts and sensations. These are felt on the skin and deeper in the tissues.  The pain is affected with colder weather and with wet and humid conditions.     Pain everywhere. Tried PT twice minimal relief  On cymbalta with some reilef by rheum      Pain level without medication is 10/10 , with the medication pain level 4 to 5 /10.          Review of Systems :  Denied any fever or chills. No weight loss and no night sweats. No cough or sputum production. No diarrhea   The constipation has been responding to fibers and over the counter medications.     No bladder and bowel incontinence and no other changes in bladder and bowel. No skin changes.  Reports tiredness and fatigability only if the pain is not controlled.     Denied opioids diversion and abuse and denies alcoholism. Denies overuse of the pain medications.  No reported euphoria sensation or getting a \"high\" on the pain medications.       Physical examination  Awake, alert and oriented for time place and persons   declined Chaperone for the visit and was adequately  draped for the exam.    Limited ROM of the  upper and lower limbs  There is tenderness on " palpation at the points of the upper trapezius,  sterno clavicular joints, elbows, knees, lower back, were all sensitive to touch and minor pressure  producing whitening of the tips of the thumb nail produced the pain.   No joint swelling no skin changes of texture or color but the joints feel swollen intermittently  no aberrant pain behavior  cerebral palsy     Diagnosis  Problem List Items Addressed This Visit       Fibromyalgia - Primary    Acute exacerbation of chronic low back pain        Plan  Reviewed the pain generators.  Went over the types of pain with neuropathic and nociceptive and different pathologies and therapeutic modalities. Discussed the mechanism of action of interventions from acupuncture, physical therapy , regular exercises, injections, botox, spinal cord stimulation, and role of surgery     Went over pathology of the  pain in soft tissue and she has clinical picture of FM. She feels she has something else , I went over the lab test from 6/19 and the the Rheum work up was negative    Dw her about FM and we can consider increase cymbalta to 120 mg or change to Savella lower dose and increase slowly to therapeutic effect.      Discussed about NSAIDS and I explained about the opioids sparing effect to allow keeping the opioids dose at minimal effective dose.   I went over the potential side effects of the NSAIDS on the gastrointestinal, renal and cardiovascular systems.      I detailed the side effects from the acetaminophen in the medication and made aware of those. I also explained about the cumulative effects on the organs and mainly the liver.     Given the opioids therapy , we discussed about the risk for accidental over dose on the pain medications, either for patient or other household. I went over the mechanism of action and mode of use of the Naloxone according to the  recommendations. I will provide a prescription for a kit.     Follow-up after above or earlier if needed      The level of clinical decision making in this office visit,  is high, given the high risks of complications with the morbidity and mortality due to the fact that acute and chronic pain may pose a threat to life and bodily function, if under treated, poorly treated, or with failure to maintain adequate treatment and timely medical follow up. Additionally over treatment has its own set of complications including overdosing on the pain medications and also the habit forming potentials with the use of the medications used to treat chronic painful conditions including therapeutic classes classified as dangerous medications. Given the serious and fluctuating nature of pain level and instensity with extensive consideration for whenever pain changes, there is always the risk of prolonged functional impairment requiring close patient monitoring with regular assessments and reassessments and high level medical decision making at every office visit. The amount and complexity of data reviewed is high given the patient clinical presentation, labs,  data, radiology reports, and other tests as discussed during office visits. Pertinent data whether positive or negative were taken in consideration in the process of making this high level medical decision.

## 2024-08-06 ENCOUNTER — SPECIALTY PHARMACY (OUTPATIENT)
Dept: PHARMACY | Facility: CLINIC | Age: 39
End: 2024-08-06

## 2024-08-06 PROCEDURE — RXMED WILLOW AMBULATORY MEDICATION CHARGE

## 2024-08-07 ENCOUNTER — HOSPITAL ENCOUNTER (OUTPATIENT)
Dept: RADIOLOGY | Facility: CLINIC | Age: 39
Discharge: HOME | End: 2024-08-07
Payer: COMMERCIAL

## 2024-08-07 DIAGNOSIS — G89.29 ACUTE EXACERBATION OF CHRONIC LOW BACK PAIN: ICD-10-CM

## 2024-08-07 DIAGNOSIS — M54.50 ACUTE EXACERBATION OF CHRONIC LOW BACK PAIN: ICD-10-CM

## 2024-08-07 PROCEDURE — 72146 MRI CHEST SPINE W/O DYE: CPT

## 2024-08-07 PROCEDURE — 72148 MRI LUMBAR SPINE W/O DYE: CPT | Performed by: RADIOLOGY

## 2024-08-07 PROCEDURE — 72146 MRI CHEST SPINE W/O DYE: CPT | Performed by: RADIOLOGY

## 2024-08-07 PROCEDURE — 72148 MRI LUMBAR SPINE W/O DYE: CPT

## 2024-08-09 ENCOUNTER — TELEPHONE (OUTPATIENT)
Dept: PRIMARY CARE | Facility: CLINIC | Age: 39
End: 2024-08-09
Payer: COMMERCIAL

## 2024-08-09 DIAGNOSIS — N85.8 MASS OF UTERUS: Primary | ICD-10-CM

## 2024-08-12 ENCOUNTER — APPOINTMENT (OUTPATIENT)
Dept: RHEUMATOLOGY | Facility: CLINIC | Age: 39
End: 2024-08-12
Payer: COMMERCIAL

## 2024-08-12 ENCOUNTER — HOSPITAL ENCOUNTER (OUTPATIENT)
Dept: RADIOLOGY | Facility: CLINIC | Age: 39
Discharge: HOME | End: 2024-08-12
Payer: COMMERCIAL

## 2024-08-12 DIAGNOSIS — N85.8 MASS OF UTERUS: ICD-10-CM

## 2024-08-12 PROCEDURE — 76856 US EXAM PELVIC COMPLETE: CPT

## 2024-08-12 PROCEDURE — 76830 TRANSVAGINAL US NON-OB: CPT | Performed by: RADIOLOGY

## 2024-08-12 PROCEDURE — 76856 US EXAM PELVIC COMPLETE: CPT | Performed by: RADIOLOGY

## 2024-08-15 ENCOUNTER — OFFICE VISIT (OUTPATIENT)
Dept: NEUROLOGY | Facility: HOSPITAL | Age: 39
End: 2024-08-15
Payer: COMMERCIAL

## 2024-08-15 DIAGNOSIS — G90.A POTS (POSTURAL ORTHOSTATIC TACHYCARDIA SYNDROME): Primary | ICD-10-CM

## 2024-08-15 PROCEDURE — 99417 PROLNG OP E/M EACH 15 MIN: CPT | Performed by: PSYCHIATRY & NEUROLOGY

## 2024-08-15 PROCEDURE — 99215 OFFICE O/P EST HI 40 MIN: CPT | Performed by: PSYCHIATRY & NEUROLOGY

## 2024-08-15 NOTE — PROGRESS NOTES
[facility] AUTONOMIC PROGRAM         Milagros Qiu MD    Professor of Neurology  [facility]  Senior Attending Physician- [facility]  Director, Autonomic Program and Laboratories  Medical Director, [employer] [place] [address]  Office: [phone]  Assistant: Aspen Castanon (email: [email or web address])       AUTONOMIC NERVOUS SYSTEM CONSULTATION    Patient Information     Medical Record Number: [id number]   YOB: 1985    Home Address: [facility]  [address]  [address] [place] [address]   Phone Number:  [phone]      Primary Care Physician: Cherelle Rico MD    Referring Physician: No referring provider defined for this encounter.    Patient accompanied by:  (Red)  In addition to attending physician, patient seen by: Zahira Gaytan MD      Clinical Scores    CLINICAL SCORES    [facility] 31 : 64/100  [place]: 2 (pinky)    IMPRESSION:  Miguelina Gloria is a 38 y.o. y/o right handed woman who has been experiencing a constellation of symptoms including migraine headaches, stomach pain, her palpitation, heat intolerance, excessive sweating, dry eyes/mouth, dim and tunneling of her vision for 20 years that has progressively worsened since 2020/2021.  She also experiences numbness and tingling of her hands and intermittent orthostatic lightheadedness and has had several syncopal and near syncopal episodes.  She has a strong family history of autoimmune diseases.    She had tilt table test in spring 2022, which showed accentuated orthostatic tachycardia and was concerning for POTS; there was also evidence of postganglionic small fiber neuropathy.  She has been doing nonpharmacologic interventions as much as she can and taking propranolol 20 mg 3 times daily which help with her symptoms somewhat.     Impression:  - POTS  - small fiber neuropathy  Both appear to be  "progressively worsening and we will look for a possible underlying etiology. Will also rule out large fiber neuropathy given history of chronic low back pain and numbness/tingling.    PLAN/RECOMMENDATIONS:   - repeat autonomic study to see possible disease progression  - EMG/NCS to rule out large fiber neuropathy  - blood and urine studies for autonomic symptoms  - Referral to ENT for lip biopsy to rule out Sjogren's  - prescription for compression stocking    We also recommended following the \"5 conservative measures\":   1. Wearing waist-high compression stockings 40-50 mmHg in compression, every day as long she is up and about and removing them at bedtime  2. Drinking one gallon of water a day  3. Eating at least 6 gm of salt (roughly 3 teaspoons) throughout her day.  4. Elevating the head of her bed 45 degrees (not by stacking pillows, but bending her mattress up and stacking bricks underneath).  5. Jogging in water 1 hour every day    HPI    Miguelina Gloria is a 38 y.o. y/o right-handed  nonbinary presenting to the  Autonomic Program for the evaluation of POTS.    History details   Since 2003 she has been experiencing symptoms including migraine headache, stomach pain, heart palpitation, feeling super warm, sweating, and room fading to black. She passed out twice, in 2011 and 2014, and also had several near syncope experiences that she thinks may be related to standing up but has had those when sitting down. Mornings are bad for her. Things started to get bad in 2020/2021 when she started having stomach pain and bloating every day, migraine daily, as well as the other symptoms. Currently, she feels fatigued, heart palpitation, sweating, increased heart rate, constipation etc. She experiences swelling in her ankles and legs for which she wears compression stocking.     In Spring 2022, she had tilt table test (result found in our old EMR) that was positive for POTS. She started using compression " "stocking (knee high; difficult to find stocking that fits her body side), increased salt and water intake (now drinks at least 96 oz a day and liquid IV hydration packets). It's difficult to tolerate wedges under her bed due to back discomfort. She thinks the compression stockings are helpful. She was started on propranolol and now 20mg TID. She thinks it helps.     She has been working with a neurologist for migraine and has been on Emgality and her neurologist is considering decrease propranolol as her migraine is better controlled.      She has experienced dry eyes requiring eye drops, dry mouth (used to chew gum), trouble swallowing both solids and liquids (\"get stuck\"), no unintentional weight loss,    Sometimes she has numbness and tingling in both hands, left worse than right. Pain in joints (hips, shoulders, ankles etc).    Since Oct 2023, she had a couple bad falls, no longer feels stable, so started using a rollator. Falls due to losing balance.     Relevant Past Medical History:  Ocular Migraine  Fibromyalgia  Chronic pain in joints and muscle  Arthritis in knees and back    Relevant Past Surgical History:  none    Relevant Family History:   Mom and aunt and little cousin all have something similar to what she is experiencing  Little cousin with multiple sclerosis  Sister and niece with lupus    AUTONOMIC REVIEW OF SYSTEMS    [facility] 31 for research purposes only (see below)  Point Value Question Answer Options   1 1. In the past year, have you ever felt faint, dizzy, “goofy”, or had difficulty thinking soon after standing up from a sitting or lying position? Yes   2 2. When standing up, how frequently do you get these feelings or symptoms? Frequently   3 3. How would you rate the severity of these feelings or symptoms? Severe   1 4. In the past year, have these feelings or symptoms that you have experienced: Stayed about the same   7     28     1 5. In the past year, have you ever noticed color changes " in your skin, such as red, white, or purple? (If you answer no, please skip to question 8) Yes   1 6. What parts of your body are affected by these color changes? Hands   1 7. Have these changes in your skin color: Stayed about the same   3     2     1 8. In the past 5 years, what changes, if any, have occurred in your general body sweating? I sweat much more than I used to   1 9. Do your eyes feel excessively dry? Yes   1 10. Does you mouth feel excessively dry? Yes   1 11. For the symptom of dry eyes or dry mouth that you have had for the longest period of time, has this symptom: Stayed about the same   4     9     2 12. In the past year, have you noticed any changes in how quickly you get full when eating a meal? I get full a lot more quickly now than I used to   2 13. In the past year, have you felt excessively full or persistently full (bloated feeling) after a meal? A lot of the time   0 14. In the past year, have you vomited after a meal? Never   2 15. In the past year, have you had a cramping or colicky abdominal pain? A lot of the time   0 16. In the past year, have you had any bouts of diarrhea? (If you answer no, please skip to question 20) No   0 17. How frequently does this occur?    0 18. How severe are these bouts of diarrhea?    0 19. Have your bouts of diarrhea gotten:    1 20. In the past year, have you been constipated? (If you answer no, please skip to question 24) Yes   3 21. How frequently are you constipated? Almost Always   2 22. How severe are these episodes of constipation? Moderate   1 23. Has your constipation gotten: Stayed about the same   13     12     3 24. In the past year, have you ever lost control of your bladder function? Constantly   3 25. In the past year, have you had difficulty passing urine? Constantly   3 26. In the past year, have you had trouble completely emptying your bladder? Constantly   9     10     3 27. In the past year, without sunglasses or tinted glasses, has  bright light bothered your eyes? (If you anika never, please skip to question 29) Constantly   3 28. How severe is this sensitivity to bright light? Severe   1 29. In the past year, have you had trouble focusing your eyes? (If you anika never, please skip to question 31) Occasionally   1 30. How severe is this focusing problem? Mild   3 31. Has the most troublesome symptom with your eyes (i.e. sensitivity to bright light or trouble focusing) gotten: Gotten much worse   11     [id number]          TOTAL     64 /100        Additional Autonomic Review of Systems    Genitourinary    a. Erectile dysfunction N/A  b. Ejaculation dysfunction N/A  c. Vaginal dryness Yes  d. Difficulty climaxing No    Autoimmune symptoms    a. Chronic joint pain Yes  b. Chronic skin disease Yes  c. Chronic muscle pain Yes    GENERAL EXAMINATION    Overall appearance: NAD, well-nourished, obese, well-kempt, and pleasant  Extremities: abnormal    NEUROLOGICAL EXAMINATION    A. Cognition and Language  1. Patient is alert, oriented to time, place and persons  2. Language normal  3. Dysarthria: not present  4. Memory: no apparent deficit  5. MMSE: N/A    B. Cranial Nerves  1. Fundi: not examined  2. Olfaction: not tested  3. Eye movements: EOMI   4. Pupils: PERRLA  5. Facial sensation: normal  6. Facial motor: normal  7. Hearing: Decreased hearing in left ear to finger rub  8. Palate: elevates symmetrically bilaterally  9. SCM: normal strength  10. Tongue: midline    C. Motor examination (MRC [0 to 5] or modified MRC [pluses and minuses] classification)  1. Neck flexion :                         not tested, no complaints  2. Neck extension:                      not tested, no complaints  3. Eye closure:    Normal  4. Lip closure:    Normal  5. Shoulder elevation:   Right  normal Left: normal  6. Deltoids:    Right:  5/5  Left: 5/5  7. Biceps:    Right: 5/5  Left: 5/5  8. Triceps:    Right: 5/5  Left: 5/5  9. Forearm pronation:   Right: 5/5 Left:  5/5  10. Forearm supination:   Right:  5/5 Left: 5/5  11. Wrist flexion:    Right: 5/5 Left: 5/5  12. Wrist extension:   Right : 5/5 Left: 5/5  13. Intrinsic Hand Muscles:  Right:  5/5 Left: 5/5  14.  Hip flexion   Right: 5/5 Left: 5/5  15.  Hip extension   Right: 5/5 Left: 5/5  16.  Knee flexion   Right: 5/5 Left: 5/5  17. Knee extension  Right: 5/5 Left: 5/5  18. Dorsiflexion   Right: 5/5 Left: 5/5  19.  Plantar flexion   Right: 5/5 Left: 5/5  20. Toe extension   Right: 5/5 Left: 5/5  21. Toe flexion   Right: 5/5 Left: 5/5      D. Sensory examination  1. In the Lower Extremity    a. There is a sensory gradient to pinprick, distal to proximal to approximately the dorsum of the foot on the left side, patchy decreased sensation to pinprick on the right side   b. There is a vibration perception gradient distal to proximal (based on a [employer] C-64Hz tuning fork)    i. At the toe: normal  2. In the upper extremity: gradient in gloves distribution: NO.  E. Reflexes (NINDS classification 0 to 4)    Biceps:   Right 2 Left 2  Triceps:   Right 2 Left 2  Knee:    Right 2 Left 2  Ankles:   Right 2 Left 2    Additional reflexes if relevant (Absent or Present):     Babinski:   Right  Left  Pectorals:   Right  Left  Hamstrings:   Right  Left  Egan:   Right  Left  Palmomental:   Right  Left  Snout:    ight   Left  Grab:    Right   Left  Jaw jerk:   Absent  Glabellar:   Absent    F. Coordination    F to N: Normal  H to S: Normal    G. Gait    Patient felt lightheaded upon standing-up from supine position: No    Normal gait  Wide base - ambulate with a rollator    I spent 100 minutes with the patient, at least 50% of which were dedicated to education and detailing diagnostic plans and management.      Milagros Qiu MD

## 2024-08-15 NOTE — PATIENT INSTRUCTIONS
"It was a pleasure meeting you today. Your symptoms are consistent with POTS and we want to look to see if there is any underlying disorder that causes POTS. We do not think you have EDS. You had an autonomic study 2 years ago but since your symptoms have progressed, we will repeat the test to see if there is any progression. Below is the plan:    - repeat autonomic study to see possible disease progression  - EMG/NCS to rule out large fiber neuropathy  - blood and urine studies for autonomic symptoms  - Referral to ENT for lip biopsy to look for abnormalities in your salivary gland  - prescription for compression stocking    I would recommend following the \"5 conservative measures\":   1. Wearing waist-high compression stockings 40-50 mmHg in compression, every day as long she is up and about and removing them at bedtime  2. Drinking one gallon of water a day  3. Eating at least 6 gm of salt (roughly 3 teaspoons) throughout her day.  4. Elevating the head of her bed 45 degrees (not by stacking pillows, but bending her mattress up and stacking bricks underneath).  5. Jogging in water 1 hour every day    Stay on propranolol 20mg three times a day for now.   "

## 2024-08-16 ENCOUNTER — TELEPHONE (OUTPATIENT)
Dept: PRIMARY CARE | Facility: CLINIC | Age: 39
End: 2024-08-16
Payer: COMMERCIAL

## 2024-08-17 ENCOUNTER — PHARMACY VISIT (OUTPATIENT)
Dept: PHARMACY | Facility: CLINIC | Age: 39
End: 2024-08-17
Payer: MEDICARE

## 2024-08-19 ENCOUNTER — APPOINTMENT (OUTPATIENT)
Dept: RHEUMATOLOGY | Facility: CLINIC | Age: 39
End: 2024-08-19
Payer: COMMERCIAL

## 2024-08-26 ENCOUNTER — APPOINTMENT (OUTPATIENT)
Dept: RHEUMATOLOGY | Facility: CLINIC | Age: 39
End: 2024-08-26
Payer: COMMERCIAL

## 2024-08-26 VITALS
HEART RATE: 91 BPM | DIASTOLIC BLOOD PRESSURE: 86 MMHG | SYSTOLIC BLOOD PRESSURE: 131 MMHG | HEIGHT: 68 IN | OXYGEN SATURATION: 99 % | BODY MASS INDEX: 44.41 KG/M2 | WEIGHT: 293 LBS

## 2024-08-26 DIAGNOSIS — M79.7 FIBROMYALGIA: Primary | ICD-10-CM

## 2024-08-26 PROCEDURE — 3008F BODY MASS INDEX DOCD: CPT | Performed by: INTERNAL MEDICINE

## 2024-08-26 PROCEDURE — 99214 OFFICE O/P EST MOD 30 MIN: CPT | Performed by: INTERNAL MEDICINE

## 2024-08-26 PROCEDURE — 1036F TOBACCO NON-USER: CPT | Performed by: INTERNAL MEDICINE

## 2024-08-26 ASSESSMENT — PAIN SCALES - GENERAL: PAINLEVEL: 3

## 2024-08-26 NOTE — PROGRESS NOTES
Subjective   Patient ID: Miguelina Gloria is a 38 y.o. adult who presents for Med Management (Patient would like to discuss possible medication adjustments. ).    HPI  Problems  Virtual appt video today  Assessed    · Hypermobility syndrome (728.5) (M35.7)   · Chronic pain of multiple sites (780.96,338.29) (R52,G89.29)   · Fibromyalgia (729.1) (M79.7)   · POTS (postural orthostatic tachycardia syndrome) (427.89) (G90.A)     Pain much improved with Duloxitine 60 mg daily  No other issues     Last seen July 2023    Fall in November at home   Went to PT   Can not sleep or move  Got MRI t and lumbar spine   PCP sent to pain mgment  Sent back to me about increasing cybalta dose         ROS      Current Outpatient Medications   Medication Sig Dispense Refill    amitriptyline (Elavil) 10 mg tablet TAKE 1 TABLET BY MOUTH EVERYDAY AT BEDTIME 90 tablet 2    azelastine (Astelin) 137 mcg (0.1 %) nasal spray USE 1 SRPAY EACH NOSTRIL DAILY AS DIRECTED      calcium carbonate-vitamin D3 600 mg-20 mcg (800 unit) tablet Take by mouth.      DULoxetine (Cymbalta) 60 mg DR capsule Take 1 capsule (60 mg) by mouth once daily. 90 capsule 3    ferrous sulfate 325 (65 Fe) MG tablet Take 1 tablet (325 mg) by mouth 2 times a day.      fexofenadine-pseudoephedrine (Allegra-D 24 Hour) 180-240 mg 24 hr tablet Take 1 tablet by mouth once daily.      FIBER, CALCIUM POLYCARBOPHIL, ORAL Take 5 g by mouth once daily.      galcanezumab (Emgality Pen) 120 mg/mL auto-injector Inject 1 Syringe (120 mg) under the skin every 28 (twenty-eight) days. Loading dose of 240mg x 1 then 120mg subcutaneous every month thereafter 1 each 5    ibuprofen 600 mg tablet Take by mouth 3 times a day as needed.      L.acid,ferm,nichelle,rha-B.bif,long (Controlled Delivery Probiotic) 126 mg (2 billion cell) tablet,delayed and ext.release Take 1 tablet by mouth once daily.      Linzess 145 mcg capsule TAKE 1 CAPSULE BY MOUTH EVERY DAY 90 capsule 2    magnesium 250 mg tablet Magnesium  250 MG Oral Tablet  Refills: 0  Start: 19-Apr-2023      multivit-minerals/folic acid (CENTRUM ADULTS ORAL) Take 1 tablet by mouth once daily.      propranolol (Inderal) 20 mg tablet Take 1 tablet (20 mg) by mouth 3 times a day. 90 tablet 5    rimegepant (Nurtec ODT) 75 mg tablet,disintegrating Alow one (1) tablet to dissolve on or under the tongue once daily as needed for migraine. Do not take more than one (1) tablet in a 24 hour period. 16 tablet 3    triamcinolone (Kenalog) 0.1 % ointment Apply 1 Application topically 2 times a day.      triamcinolone (Nasacort) 55 mcg nasal inhaler Nasal Allergy 24 Hour 55 MCG/ACT Nasal Aerosol  Refills: 0      cyclobenzaprine (Flexeril) 10 mg tablet Take 1 tablet (10 mg) by mouth as needed at bedtime for muscle spasms. 10 tablet 0    famotidine (Pepcid) 20 mg tablet Take 1 tablet (20 mg) by mouth 2 times a day as needed.      ferrous fumarate/ascorbic acid (FERROUS FUMARATE-VITAMIN C ORAL) Take 1 tablet by mouth once daily.      lactobacillus acidophilus capsule Take 1 capsule by mouth once daily. Duplicate. Patient only takes 1 Probiotic      multivit-mins no.11-folic acid (Dialyvite 5000) 5 mg tablet Take by mouth once daily. duplicate      olopatadine-mometasone (Ryaltris) 665-25 mcg/spray spray,non-aerosol Administer 2 sprays into affected nostril(s) once daily. Use 2 sprays to each nostril once daily. (Patient not taking: Reported on 8/26/2024) 29 g 11     No current facility-administered medications for this visit.         has a past medical history of Impacted cerumen, bilateral (10/26/2020), Migraine, unspecified, not intractable, without status migrainosus, Otalgia, bilateral (11/02/2020), Other allergy status, other than to drugs and biological substances, Other conditions influencing health status, Personal history of diseases of the blood and blood-forming organs and certain disorders involving the immune mechanism, Personal history of other diseases of the  digestive system (11/20/2020), Personal history of other diseases of the female genital tract (05/13/2019), and Personal history of other diseases of the nervous system and sense organs.   Past Surgical History:   Procedure Laterality Date    OTHER SURGICAL HISTORY  12/14/2020    No history of surgery      Social History     Tobacco Use    Smoking status: Never    Smokeless tobacco: Never   Vaping Use    Vaping status: Never Used   Substance Use Topics    Alcohol use: Never    Drug use: Never      family history includes Diabetes in an other family member; Eczema in Miguelina's mother; Heart disease in Miguelina's cousin and another family member; Hypertension in an other family member; Lung cancer in Miguelina's father; Stroke in Miguelina's father and another family member; environmental allergies in an other family member.  Pain Management Panel           No data to display                 There were no vitals filed for this visit.  Lab Results   Component Value Date    RF <10 06/19/2023    SEDRATE 68 (H) 06/19/2023    CRP 1.38 (A) 06/19/2023    TSH 0.97 09/12/2022       PHYSICAL EXAM      NODES   HEART  LUNGS  ABDOMEN   VASCULAR  NEURO   SKIN  JOINTS     PLAN  There are no diagnoses linked to this encounter.    Patient last seen 1 year ago was referred back from pain management to assess her fibromyalgia    She had a fall last November getting out of the bathtub and since then she has increasing back pain.  She saw her PCP who ordered thoracic and lumbar MRIs which were within normal limits.    Pain management suggested increasing her duloxetine from 60 mg to either 90 or 120 mg but this is unlikely to help her pain.    Since she is already taking Elavil at 10 mg at night for some GI swallowing issues I suggested she increase the dose to 20 mg at night to see if this helps her sleep and subsequently helps her pain.  She is somewhat reluctant to do this because she is already obese and one of the side effects from this  class of drug is weight gain.  She will think about it and contact me again via [employer] messaging if she wants me to increase this.

## 2024-08-29 ENCOUNTER — SPECIALTY PHARMACY (OUTPATIENT)
Dept: PHARMACY | Facility: CLINIC | Age: 39
End: 2024-08-29

## 2024-08-29 DIAGNOSIS — G43.119 INTRACTABLE MIGRAINE WITH AURA WITHOUT STATUS MIGRAINOSUS: ICD-10-CM

## 2024-08-30 PROCEDURE — RXMED WILLOW AMBULATORY MEDICATION CHARGE

## 2024-09-07 ENCOUNTER — LAB (OUTPATIENT)
Dept: LAB | Facility: LAB | Age: 39
End: 2024-09-07
Payer: COMMERCIAL

## 2024-09-07 DIAGNOSIS — G43.119 INTRACTABLE MIGRAINE WITH AURA WITHOUT STATUS MIGRAINOSUS: ICD-10-CM

## 2024-09-07 DIAGNOSIS — G90.A POTS (POSTURAL ORTHOSTATIC TACHYCARDIA SYNDROME): ICD-10-CM

## 2024-09-07 LAB
BASOPHILS # BLD AUTO: 0.03 X10*3/UL (ref 0–0.1)
BASOPHILS NFR BLD AUTO: 0.5 %
EOSINOPHIL # BLD AUTO: 0.1 X10*3/UL (ref 0–0.7)
EOSINOPHIL NFR BLD AUTO: 1.7 %
ERYTHROCYTE [DISTWIDTH] IN BLOOD BY AUTOMATED COUNT: 13.9 % (ref 11.5–14.5)
HCT VFR BLD AUTO: 39.1 % (ref 36–52)
HGB BLD-MCNC: 11.8 G/DL (ref 12–17.5)
IMM GRANULOCYTES # BLD AUTO: 0.02 X10*3/UL (ref 0–0.7)
IMM GRANULOCYTES NFR BLD AUTO: 0.3 % (ref 0–0.9)
LYMPHOCYTES # BLD AUTO: 1.84 X10*3/UL (ref 1.2–4.8)
LYMPHOCYTES NFR BLD AUTO: 31 %
MCH RBC QN AUTO: 23.6 PG (ref 26–34)
MCHC RBC AUTO-ENTMCNC: 30.2 G/DL (ref 32–36)
MCV RBC AUTO: 78 FL (ref 80–100)
MONOCYTES # BLD AUTO: 0.55 X10*3/UL (ref 0.1–1)
MONOCYTES NFR BLD AUTO: 9.3 %
NEUTROPHILS # BLD AUTO: 3.39 X10*3/UL (ref 1.2–7.7)
NEUTROPHILS NFR BLD AUTO: 57.2 %
NRBC BLD-RTO: 0 /100 WBCS (ref 0–0)
PLATELET # BLD AUTO: 377 X10*3/UL (ref 150–450)
RBC # BLD AUTO: 5.01 X10*6/UL (ref 4–5.9)
WBC # BLD AUTO: 5.9 X10*3/UL (ref 4.4–11.3)

## 2024-09-07 PROCEDURE — 86255 FLUORESCENT ANTIBODY SCREEN: CPT

## 2024-09-07 PROCEDURE — 83921 ORGANIC ACID SINGLE QUANT: CPT

## 2024-09-07 PROCEDURE — 82384 ASSAY THREE CATECHOLAMINES: CPT

## 2024-09-07 PROCEDURE — 84155 ASSAY OF PROTEIN SERUM: CPT

## 2024-09-07 PROCEDURE — 83521 IG LIGHT CHAINS FREE EACH: CPT

## 2024-09-07 PROCEDURE — 84446 ASSAY OF VITAMIN E: CPT

## 2024-09-07 PROCEDURE — 84165 PROTEIN E-PHORESIS SERUM: CPT

## 2024-09-07 PROCEDURE — 83516 IMMUNOASSAY NONANTIBODY: CPT

## 2024-09-07 PROCEDURE — 84443 ASSAY THYROID STIM HORMONE: CPT

## 2024-09-07 PROCEDURE — 82390 ASSAY OF CERULOPLASMIN: CPT

## 2024-09-07 PROCEDURE — 86738 MYCOPLASMA ANTIBODY: CPT

## 2024-09-07 PROCEDURE — 86431 RHEUMATOID FACTOR QUANT: CPT

## 2024-09-07 PROCEDURE — 86140 C-REACTIVE PROTEIN: CPT

## 2024-09-07 PROCEDURE — 82085 ASSAY OF ALDOLASE: CPT

## 2024-09-07 PROCEDURE — 85652 RBC SED RATE AUTOMATED: CPT

## 2024-09-07 PROCEDURE — 85025 COMPLETE CBC W/AUTO DIFF WBC: CPT

## 2024-09-07 PROCEDURE — 84436 ASSAY OF TOTAL THYROXINE: CPT

## 2024-09-07 PROCEDURE — 86800 THYROGLOBULIN ANTIBODY: CPT

## 2024-09-07 PROCEDURE — 36415 COLL VENOUS BLD VENIPUNCTURE: CPT

## 2024-09-07 PROCEDURE — 86038 ANTINUCLEAR ANTIBODIES: CPT

## 2024-09-07 PROCEDURE — 82525 ASSAY OF COPPER: CPT

## 2024-09-07 PROCEDURE — 80048 BASIC METABOLIC PNL TOTAL CA: CPT

## 2024-09-07 PROCEDURE — 84110 ASSAY OF PORPHOBILINOGEN: CPT

## 2024-09-07 PROCEDURE — 82542 COL CHROMOTOGRAPHY QUAL/QUAN: CPT

## 2024-09-07 PROCEDURE — 86320 SERUM IMMUNOELECTROPHORESIS: CPT | Performed by: PSYCHIATRY & NEUROLOGY

## 2024-09-07 PROCEDURE — 84481 FREE ASSAY (FT-3): CPT

## 2024-09-07 PROCEDURE — 83090 ASSAY OF HOMOCYSTEINE: CPT

## 2024-09-07 PROCEDURE — 86334 IMMUNOFIX E-PHORESIS SERUM: CPT

## 2024-09-07 PROCEDURE — 82607 VITAMIN B-12: CPT

## 2024-09-07 PROCEDURE — 83918 ORGANIC ACIDS TOTAL QUANT: CPT

## 2024-09-07 PROCEDURE — 83519 RIA NONANTIBODY: CPT

## 2024-09-07 PROCEDURE — 84207 ASSAY OF VITAMIN B-6: CPT

## 2024-09-07 PROCEDURE — 84165 PROTEIN E-PHORESIS SERUM: CPT | Performed by: PSYCHIATRY & NEUROLOGY

## 2024-09-07 PROCEDURE — 83520 IMMUNOASSAY QUANT NOS NONAB: CPT

## 2024-09-07 PROCEDURE — 86376 MICROSOMAL ANTIBODY EACH: CPT

## 2024-09-08 LAB
ANION GAP SERPL CALC-SCNC: 14 MMOL/L (ref 10–20)
BUN SERPL-MCNC: 9 MG/DL (ref 6–23)
CALCIUM SERPL-MCNC: 8.6 MG/DL (ref 8.6–10.6)
CERULOPLASMIN SERPL-MCNC: 33 MG/DL (ref 20–60)
CHLORIDE SERPL-SCNC: 101 MMOL/L (ref 98–107)
CO2 SERPL-SCNC: 28 MMOL/L (ref 21–32)
CREAT SERPL-MCNC: 0.79 MG/DL (ref 0.5–1.3)
CRP SERPL-MCNC: 2.08 MG/DL
EGFRCR SERPLBLD CKD-EPI 2021: >90 ML/MIN/1.73M*2
ERYTHROCYTE [SEDIMENTATION RATE] IN BLOOD BY WESTERGREN METHOD: 67 MM/H (ref 0–20)
GLIADIN PEPTIDE IGA SER IA-ACNC: <1 U/ML
GLUCOSE SERPL-MCNC: 95 MG/DL (ref 74–99)
HCYS SERPL-SCNC: 13.76 UMOL/L (ref 5–13.9)
KAPPA LC SERPL-MCNC: 3.39 MG/DL (ref 0.33–1.94)
KAPPA LC/LAMBDA SER: 1.04 {RATIO} (ref 0.26–1.65)
LAMBDA LC SERPL-MCNC: 3.26 MG/DL (ref 0.57–2.63)
POTASSIUM SERPL-SCNC: 4.5 MMOL/L (ref 3.5–5.3)
PROT SERPL-MCNC: 7 G/DL (ref 6.4–8.2)
RHEUMATOID FACT SER NEPH-ACNC: <10 IU/ML (ref 0–15)
SODIUM SERPL-SCNC: 138 MMOL/L (ref 136–145)
T3FREE SERPL-MCNC: 2.7 PG/ML (ref 2.3–4.2)
T4 SERPL-MCNC: 9.5 UG/DL (ref 4.5–11.1)
THYROPEROXIDASE AB SERPL-ACNC: 30 IU/ML
TSH SERPL-ACNC: 0.85 MIU/L (ref 0.44–3.98)
TTG IGA SER IA-ACNC: <1 U/ML
VIT B12 SERPL-MCNC: 640 PG/ML (ref 211–911)

## 2024-09-09 LAB
ALDOLASE SERPL-CCNC: 8.1 U/L (ref 1.2–7.6)
ANA SER QL HEP2 SUBST: NEGATIVE
THYROGLOB AB SERPL-ACNC: <0.9 IU/ML (ref 0–4)

## 2024-09-09 RX ORDER — PROPRANOLOL HYDROCHLORIDE 20 MG/1
20 TABLET ORAL 3 TIMES DAILY
Qty: 90 TABLET | Refills: 0 | Status: SHIPPED | OUTPATIENT
Start: 2024-09-09

## 2024-09-10 ENCOUNTER — SPECIALTY PHARMACY (OUTPATIENT)
Dept: PHARMACY | Facility: CLINIC | Age: 39
End: 2024-09-10

## 2024-09-10 LAB
COPPER SERPL-MCNC: 123.8 UG/DL (ref 80–155)
GLIADIN PEPTIDE IGG SER IA-ACNC: <0.56 FLU (ref 0–4.99)
M PNEUMO IGM SER IA-ACNC: 0.18 U/L
TTG IGG SER IA-ACNC: <0.82 FLU (ref 0–4.99)

## 2024-09-11 LAB
3OH-DODECANOYLCARN SERPL-SCNC: 0.01 UMOL/L
3OH-ISOVALERYLCARN SERPL-SCNC: <0.01 UMOL/L
3OH-LINOLEOYLCARN SERPL-SCNC: 0.01 UMOL/L
3OH-OLEOYLCARN SERPL-SCNC: <0.01 UMOL/L
3OH-PALMITOLEYLCARN SERPL-SCNC: 0.01 UMOL/L
3OH-PALMITOYLCARN SERPL-SCNC: 0.01 UMOL/L
3OH-STEAROYLCARN SERPL-SCNC: <0.01 UMOL/L
3OH-TDECANOYLCARN SERPL-SCNC: <0.01 UMOL/L
3OH-TDECENOYLCARN SERPL-SCNC: 0.01 UMOL/L
A-TOCOPHEROL VIT E SERPL-MCNC: 11.4 MG/L (ref 5.5–18)
ACETYLCARN SERPL-SCNC: 8.44 UMOL/L (ref 2.93–15.06)
ACYLCARNITINE PATTERN SERPL-IMP: NORMAL
ALBUMIN: 3.4 G/DL (ref 3.4–5)
ALPHA 1 GLOBULIN: 0.3 G/DL (ref 0.2–0.6)
ALPHA 2 GLOBULIN: 0.7 G/DL (ref 0.4–1.1)
BETA GLOBULIN: 1.1 G/DL (ref 0.5–1.2)
BETA+GAMMA TOCOPHEROL SERPL-MCNC: 1 MG/L (ref 0–6)
BUTYRYL+ISOBUTYRYLCARN SERPL-SCNC: 0.14 UMOL/L
CARN ESTERS SERPL-SCNC: 13 UMOL/L (ref 5–29)
CARN ESTERS/C0 SERPL-SRTO: 0.3 RATIO (ref 0.1–1)
CARNITINE FREE SERPL-SCNC: 42 UMOL/L (ref 25–60)
CARNITINE SERPL-SCNC: 55 UMOL/L (ref 34–86)
DECANOYLCARN SERPL-SCNC: 0.2 UMOL/L
DECENOYLCARN SERPL-SCNC: 0.14 UMOL/L
DODECANOYLCARN SERPL-SCNC: 0.05 UMOL/L
DODECENOYLCARN SERPL-SCNC: 0.05 UMOL/L
GAMMA GLOBULIN: 1.6 G/DL (ref 0.5–1.4)
GLUTARYLCARN SERPL-SCNC: 0.14 UMOL/L
HEXANOYLCARN SERPL-SCNC: 0.07 UMOL/L
IMMUNOFIXATION COMMENT: ABNORMAL
ISOVALERYL+MEBUTYRYLCARN SERPL-SCNC: 0.1 UMOL/L
LINOLEOYLCARN SERPL-SCNC: 0.08 UMOL/L
M PNEUMO IGG SER IA-ACNC: 0.21 U/L
OCTANOYLCARN SERPL-SCNC: 0.14 UMOL/L
OCTENOYLCARN SERPL-SCNC: 0.42 UMOL/L
OLEOYLCARN SERPL-SCNC: 0.16 UMOL/L
PALMITOLEYLCARN SERPL-SCNC: 0.02 UMOL/L
PALMITOYLCARN SERPL-SCNC: 0.09 UMOL/L
PATH REVIEW - SERUM IMMUNOFIXATION: ABNORMAL
PATH REVIEW-SERUM PROTEIN ELECTROPHORESIS: ABNORMAL
PBG UR-MCNC: 0.1 MG/L (ref 0–2)
PCA IGG SER-ACNC: 1.5 UNITS (ref 0–24.9)
PROPIONYLCARN SERPL-SCNC: 0.33 UMOL/L
PROTEIN ELECTROPHORESIS COMMENT: ABNORMAL
PYRIDOXAL PHOS SERPL-SCNC: 154.5 NMOL/L (ref 20–125)
STEAROYLCARN SERPL-SCNC: 0.03 UMOL/L
TDECADIENOYLCARN SERPL-SCNC: 0.03 UMOL/L
TDECANOYLCARN SERPL-SCNC: 0.03 UMOL/L
TDECENOYLCARN SERPL-SCNC: 0.06 UMOL/L

## 2024-09-12 LAB
2OXO3ME-VALERATE/CREAT UR-SRTO: NOT DETECTED (ref 0–10)
2OXOISOCAPROATE/CREAT UR-SRTO: NOT DETECTED (ref 0–4)
2OXOISOVALERATE/CREAT UR-SRTO: NOT DETECTED (ref 0–4)
4OH-PHENYLACETATE/CREAT UR-SRTO: 8 (ref 0–25)
4OH-PHENYLLACTATE/CREAT UR-SRTO: NOT DETECTED (ref 0–4)
4OH-PHENYLPYRUVATE/CREAT UR-SRTO: NOT DETECTED (ref 0–2)
A-KETOGLUT/CREAT UR-SRTO: 6 (ref 0–75)
ACETOACET/CREAT UR-SRTO: NOT DETECTED (ref 0–4)
ADIPATE/CREAT UR-SRTO: 1 (ref 0–35)
B-OH-BUTYR/CREAT UR-SRTO: 1 (ref 0–4)
CREAT UR-MCNC: 42 MG/DL
ETHYLMALONATE/CREAT UR-SRTO: 1 (ref 0–4)
FUMARATE/CREAT UR-SRTO: NOT DETECTED (ref 0–4)
LACTATE/CREAT UR-SRTO: 19 (ref 0–50)
METHYLMALONATE/CREAT UR-SRTO: 1 (ref 0–5)
ORGANIC ACIDS PATTERN UR-IMP: NORMAL
PYRUVATE/CREAT UR-SRTO: 4 (ref 0–15)
SCAN RESULT: NORMAL
SEBACATE/CREAT UR-SRTO: NOT DETECTED (ref 0–3)
SUBERATE/CREAT UR-SRTO: NOT DETECTED (ref 0–3)
SUCCINATE/CREAT UR-SRTO: 5 (ref 0–20)
SUCCINYLACETONE/CREAT UR-SRTO: NOT DETECTED (ref 0–0)

## 2024-09-13 LAB — METHYLMALONATE SERPL-SCNC: <0.1 UMOL/L (ref 0–0.4)

## 2024-09-14 LAB
AMPHIPHYSIN IGG SER QL IA: NEGATIVE
ANNOTATION COMMENT IMP: NORMAL
CV2 AB SERPL QL IF: NEGATIVE
GLIAL NUC TYPE 1 AB SER QL IF: NEGATIVE
HU1 AB SER QL: NEGATIVE
HU2 AB SER QL IF: NEGATIVE
HU3 AB SER QL: NEGATIVE
PARANEOPLASTIC AB SER-IMP: NORMAL
PCA-1 AB SER QL IF: NEGATIVE
PCA-2 AB SER QL IF: NEGATIVE
PCA-TR AB SER QL IF: NEGATIVE
VGCC-P/Q BIND IGG+IGM SER IA-SCNC: 0 NMOL/L
VGKC IGG+IGM SER IA-SCNC: 0 NMOL/L

## 2024-09-16 LAB — SCAN RESULT: NORMAL

## 2024-09-19 LAB
DOPAMINE SERPL-MCNC: <30 PG/ML (ref 0–48)
EPINEPH PLAS-MCNC: <15 PG/ML (ref 0–62)
NOREPINEPH PLAS-MCNC: 380 PG/ML (ref 0–874)

## 2024-09-21 ENCOUNTER — PHARMACY VISIT (OUTPATIENT)
Dept: PHARMACY | Facility: CLINIC | Age: 39
End: 2024-09-21
Payer: MEDICARE

## 2024-10-01 ENCOUNTER — SPECIALTY PHARMACY (OUTPATIENT)
Dept: PHARMACY | Facility: CLINIC | Age: 39
End: 2024-10-01

## 2024-10-01 DIAGNOSIS — G43.119 INTRACTABLE MIGRAINE WITH AURA WITHOUT STATUS MIGRAINOSUS: ICD-10-CM

## 2024-10-02 DIAGNOSIS — G43.119 INTRACTABLE MIGRAINE WITH AURA WITHOUT STATUS MIGRAINOSUS: ICD-10-CM

## 2024-10-02 RX ORDER — PROPRANOLOL HYDROCHLORIDE 20 MG/1
20 TABLET ORAL 3 TIMES DAILY
Qty: 270 TABLET | Refills: 1 | OUTPATIENT
Start: 2024-10-02

## 2024-10-04 DIAGNOSIS — G62.9 NEUROPATHY: Primary | ICD-10-CM

## 2024-10-04 NOTE — PROGRESS NOTES
"Called patient regarding her blood work.   - Vit B6 elevated to 154. She says she has been taking multivitamin and \"Biotin Forte\" recommended by her dermatologist for hair loss.  Told her that we will repeat been B6 level with her fasting for 12 hours.  - Discussed the need to fast for 12 hours prior to OGTT.  - Discussed that she has some blood work suggestive of inflammatory disease, such as early Sjogren's syndrome profile, which could explain her dysautonomia.  Her current appointment with ENT provider (Dr. Chris) is scheduled for next May. We recommend her to discuss with her ENT provider to see if he can do a lip biopsy to assess for Sjogren's syndrome.    - Shared with patient that were not sure why is taking a very long time for the LAI panel as it is still \"in process\" after 1 month; at this point we will reorder the LAI panel. Checked with lab and manager Praveena will cancel the prior order, so patient does not get double charged for this lab.  - Sent patient a copy of the remaining workup via [employer]  - ordered LAI and B6 level    Patient told me that she could not complete all of her workup because she needed to work enough to accumulate days off for the OGTT test.  She is planning to get her blood work done on 10/14 as she is taking the day off.    Zahira Gaytan MD  Neuromuscular Fellow    "

## 2024-10-06 DIAGNOSIS — G43.119 INTRACTABLE MIGRAINE WITH AURA WITHOUT STATUS MIGRAINOSUS: ICD-10-CM

## 2024-10-07 RX ORDER — PROPRANOLOL HYDROCHLORIDE 20 MG/1
20 TABLET ORAL 3 TIMES DAILY
Qty: 90 TABLET | Refills: 0 | OUTPATIENT
Start: 2024-10-07

## 2024-10-07 NOTE — TELEPHONE ENCOUNTER
She was due for a follow-up appointment August and is not scheduled until January.  I already sent in courtesy refill in September.  She needs to be seen ASAP

## 2024-10-14 ENCOUNTER — LAB (OUTPATIENT)
Dept: LAB | Facility: LAB | Age: 39
End: 2024-10-14

## 2024-10-14 DIAGNOSIS — G62.9 NEUROPATHY: ICD-10-CM

## 2024-10-14 DIAGNOSIS — G90.A POTS (POSTURAL ORTHOSTATIC TACHYCARDIA SYNDROME): ICD-10-CM

## 2024-10-14 LAB
CENTROMERE B AB SER-ACNC: <0.2 AI
CHROMATIN AB SERPL-ACNC: <0.2 AI
DSDNA AB SER-ACNC: <1 IU/ML
ENA JO1 AB SER QL IA: <0.2 AI
ENA RNP AB SER IA-ACNC: 0.3 AI
ENA SCL70 AB SER QL IA: <0.2 AI
ENA SM AB SER IA-ACNC: <0.2 AI
ENA SM+RNP AB SER QL IA: <0.2 AI
ENA SS-A AB SER IA-ACNC: <0.2 AI
ENA SS-B AB SER IA-ACNC: <0.2 AI
GLUCOSE 1H P 75 G GLC PO SERPL-MCNC: 65 MG/DL
GLUCOSE 2H P 75 G GLC PO SERPL-MCNC: 96 MG/DL
GLUCOSE 3H P 75 G GLC PO SERPL-MCNC: 68 MG/DL
GLUCOSE P FAST SERPL-MCNC: 95 MG/DL
PROT UR-ACNC: 5 MG/DL (ref 5–25)
RIBOSOMAL P AB SER-ACNC: <0.2 AI

## 2024-10-14 PROCEDURE — 82951 GLUCOSE TOLERANCE TEST (GTT): CPT

## 2024-10-14 PROCEDURE — 84166 PROTEIN E-PHORESIS/URINE/CSF: CPT

## 2024-10-14 PROCEDURE — 86596 VOLTAGE-GTD CA CHNL ANTB EA: CPT

## 2024-10-14 PROCEDURE — 84156 ASSAY OF PROTEIN URINE: CPT

## 2024-10-14 PROCEDURE — 86255 FLUORESCENT ANTIBODY SCREEN: CPT

## 2024-10-14 PROCEDURE — 86235 NUCLEAR ANTIGEN ANTIBODY: CPT

## 2024-10-14 PROCEDURE — 86335 IMMUNFIX E-PHORSIS/URINE/CSF: CPT

## 2024-10-14 PROCEDURE — 82952 GTT-ADDED SAMPLES: CPT

## 2024-10-14 PROCEDURE — 86225 DNA ANTIBODY NATIVE: CPT

## 2024-10-14 PROCEDURE — 83519 RIA NONANTIBODY: CPT

## 2024-10-15 ENCOUNTER — APPOINTMENT (OUTPATIENT)
Dept: NEUROLOGY | Facility: HOSPITAL | Age: 39
End: 2024-10-15
Payer: COMMERCIAL

## 2024-10-16 ENCOUNTER — TELEMEDICINE (OUTPATIENT)
Dept: NEUROLOGY | Facility: CLINIC | Age: 39
End: 2024-10-16
Payer: COMMERCIAL

## 2024-10-16 DIAGNOSIS — G43.119 INTRACTABLE MIGRAINE WITH AURA WITHOUT STATUS MIGRAINOSUS: ICD-10-CM

## 2024-10-16 PROCEDURE — 99214 OFFICE O/P EST MOD 30 MIN: CPT | Mod: 95 | Performed by: NURSE PRACTITIONER

## 2024-10-16 RX ORDER — GALCANEZUMAB 120 MG/ML
120 INJECTION, SOLUTION SUBCUTANEOUS
Qty: 1 EACH | Refills: 5 | Status: SHIPPED | OUTPATIENT
Start: 2024-10-16

## 2024-10-16 RX ORDER — PROPRANOLOL HYDROCHLORIDE 20 MG/1
20 TABLET ORAL 3 TIMES DAILY
Qty: 90 TABLET | Refills: 2 | Status: SHIPPED | OUTPATIENT
Start: 2024-10-16

## 2024-10-16 NOTE — PROGRESS NOTES
Patient being assessed today for follow-up of migraine.  She reports that she has been having some difficulty obtaining the Emgality timely from her pharmacy.  Since July she has been getting it late each month which then is causing her to have increased breakthrough activity.  Prior to the difficulty in obtaining the medication, she was only experiencing on average of 5 breakthrough migraines per month and that has slowly been increasing since July because of the delay in getting her medication each month.  We will try sending the Emgality to a different pharmacy to see if that helps alleviate the delay.  She does utilize the Nurtec for abortive treatment which is typically effective with 1 dose.  We will continue the Emgality, propranolol, Nurtec as she has been taking it.  Discussed role of medicine, importance of taking medications, potential risks, benefits, and precautions to be taken.  Reviewed sleep hygiene and dietary modifications.  Follow-up in 3 months.    This note was created with voice recognition software and was not corrected for typographical or grammatical errors

## 2024-10-17 LAB
ALBUMIN MFR UR ELPH: 23.9 %
ALPHA1 GLOB MFR UR ELPH: 9.3 %
ALPHA2 GLOB MFR UR ELPH: 19.5 %
B-GLOBULIN MFR UR ELPH: 17.2 %
COLLECT DURATION TIME SPEC: 24 HR
CREAT 24H UR-MRATE: 1725 MG/D (ref 700–1600)
CREAT UR-MCNC: 104 MG/DL
D-ALA 24H UR-SRATE: 17 UMOL/D (ref 0–60)
D-ALA UR-SCNC: 10 UMOL/L (ref 0–35)
GAMMA GLOB MFR UR ELPH: 30.1 %
IMMUNOFIXATION COMMENT: NORMAL
PATH REVIEW - URINE IMMUNOFIXATION: NORMAL
PATH REVIEW-URINE PROTEIN ELECTROPHORESIS: NORMAL
PYRIDOXAL PHOS SERPL-SCNC: 204.2 NMOL/L (ref 20–125)
SPECIMEN VOL ?TM UR: 1659 ML
URINE ELECTROPHORESIS COMMENT: NORMAL

## 2024-10-18 LAB
COLLECT DURATION TIME SPEC: 24 HR
CREAT 24H UR-MRATE: 1725 MG/D (ref 700–1600)
CREAT UR-MCNC: 104 MG/DL
METANEPH 24H UR-MCNC: 41 UG/L
METANEPH 24H UR-MRATE: 68 UG/D (ref 36–229)
METANEPH+NORMETANEPH UR-IMP: ABNORMAL
METANEPH/CREAT 24H UR: 39 UG/G CRT (ref 0–300)
NORMETANEPHRINE 24H UR-MCNC: 229 UG/L
NORMETANEPHRINE 24H UR-MRATE: 380 UG/D (ref 95–650)
NORMETANEPHRINE/CREAT 24H UR: 220 UG/G CRT (ref 0–400)
SPECIMEN VOL ?TM UR: 1659 ML

## 2024-10-19 LAB
CRYOGLOB SER QL: NORMAL
SCAN RESULT: NORMAL

## 2024-10-21 LAB
PBG 24H UR-MCNC: 0.9 MG/L (ref 0–2)
PBG 24H UR-MRATE: 1.5 MG/24 HR (ref 0–1.5)

## 2024-10-22 LAB
COPPER 24H UR-MRATE: 10 UG/24 HR (ref 3–35)
COPPER UR-MCNC: 6 UG/L
COPPER/CREAT UR: 7 UG/G CREAT (ref 0–49)
CREAT UR-MCNC: 0.92 G/L (ref 0.3–3)
SCAN RESULT: NORMAL

## 2024-10-30 ENCOUNTER — SPECIALTY PHARMACY (OUTPATIENT)
Dept: PHARMACY | Facility: CLINIC | Age: 39
End: 2024-10-30

## 2024-11-04 PROCEDURE — RXMED WILLOW AMBULATORY MEDICATION CHARGE

## 2024-11-05 ENCOUNTER — APPOINTMENT (OUTPATIENT)
Dept: NEUROLOGY | Facility: HOSPITAL | Age: 39
End: 2024-11-05
Payer: COMMERCIAL

## 2024-11-08 ENCOUNTER — PHARMACY VISIT (OUTPATIENT)
Dept: PHARMACY | Facility: CLINIC | Age: 39
End: 2024-11-08
Payer: MEDICARE

## 2024-11-12 ENCOUNTER — APPOINTMENT (OUTPATIENT)
Dept: NEUROLOGY | Facility: HOSPITAL | Age: 39
End: 2024-11-12
Payer: COMMERCIAL

## 2024-12-04 NOTE — TELEPHONE ENCOUNTER
Chief Complaint: Heel ulcer    Interval Events: No events overnight.    Review of Systems:  General: No fevers, chills, weight gain  Skin: No rashes, color changes  Cardiovascular: No chest pain, orthopnea  Respiratory: No shortness of breath, cough  Gastrointestinal: No nausea, abdominal pain  Genitourinary: No incontinence, pain with urination  Musculoskeletal: No pain, swelling, decreased range of motion  Neurological: No headache, weakness  Psychiatric: No depression, anxiety  Endocrine: No weight gain, increased thirst  All other systems are comprehensively negative.    Physical Exam:  Vital Signs Last 24 Hrs  T(C): 36.6 (04 Dec 2024 05:16), Max: 36.8 (03 Dec 2024 20:50)  T(F): 97.9 (04 Dec 2024 05:16), Max: 98.2 (03 Dec 2024 20:50)  HR: 79 (04 Dec 2024 05:16) (76 - 86)  BP: 110/69 (04 Dec 2024 05:16) (110/69 - 128/75)  BP(mean): --  RR: 18 (04 Dec 2024 05:16) (18 - 18)  SpO2: 97% (04 Dec 2024 05:16) (93% - 97%)  Parameters below as of 04 Dec 2024 05:16  Patient On (Oxygen Delivery Method): room air  General: NAD  HEENT: MMM  Neck: No JVD, no carotid bruit  Lungs: CTAB  CV: RRR, nl S1/S2, no M/R/G  Abdomen: S/NT/ND, +BS  Extremities: +Lymphedema, no cyanosis  Neuro: AAOx3, non-focal  Skin: No rash    Labs:       Patient is scheduled for a virtual 5-20-24 and an in person on 8-12-24

## 2024-12-05 DIAGNOSIS — R45.89 OTHER SYMPTOMS AND SIGNS INVOLVING EMOTIONAL STATE: ICD-10-CM

## 2024-12-05 RX ORDER — AMITRIPTYLINE HYDROCHLORIDE 10 MG/1
10 TABLET, FILM COATED ORAL NIGHTLY
Qty: 90 TABLET | Refills: 2 | Status: SHIPPED | OUTPATIENT
Start: 2024-12-05

## 2024-12-17 ENCOUNTER — PROCEDURE VISIT (OUTPATIENT)
Dept: OTOLARYNGOLOGY | Facility: CLINIC | Age: 39
End: 2024-12-17
Payer: COMMERCIAL

## 2024-12-17 VITALS
DIASTOLIC BLOOD PRESSURE: 82 MMHG | TEMPERATURE: 98.4 F | BODY MASS INDEX: 44.41 KG/M2 | HEIGHT: 68 IN | WEIGHT: 293 LBS | OXYGEN SATURATION: 98 % | SYSTOLIC BLOOD PRESSURE: 127 MMHG | HEART RATE: 83 BPM

## 2024-12-17 DIAGNOSIS — G90.A POTS (POSTURAL ORTHOSTATIC TACHYCARDIA SYNDROME): ICD-10-CM

## 2024-12-17 PROCEDURE — 42405 BIOPSY OF SALIVARY GLAND: CPT | Performed by: STUDENT IN AN ORGANIZED HEALTH CARE EDUCATION/TRAINING PROGRAM

## 2024-12-17 PROCEDURE — 88305 TISSUE EXAM BY PATHOLOGIST: CPT | Mod: TC,PARLAB | Performed by: STUDENT IN AN ORGANIZED HEALTH CARE EDUCATION/TRAINING PROGRAM

## 2024-12-17 ASSESSMENT — ENCOUNTER SYMPTOMS
DEPRESSION: 1
LOSS OF SENSATION IN FEET: 0
OCCASIONAL FEELINGS OF UNSTEADINESS: 1

## 2024-12-17 ASSESSMENT — PAIN SCALES - GENERAL: PAINLEVEL_OUTOF10: 5

## 2024-12-17 ASSESSMENT — PATIENT HEALTH QUESTIONNAIRE - PHQ9
8. MOVING OR SPEAKING SO SLOWLY THAT OTHER PEOPLE COULD HAVE NOTICED. OR THE OPPOSITE, BEING SO FIGETY OR RESTLESS THAT YOU HAVE BEEN MOVING AROUND A LOT MORE THAN USUAL: SEVERAL DAYS
1. LITTLE INTEREST OR PLEASURE IN DOING THINGS: NEARLY EVERY DAY
10. IF YOU CHECKED OFF ANY PROBLEMS, HOW DIFFICULT HAVE THESE PROBLEMS MADE IT FOR YOU TO DO YOUR WORK, TAKE CARE OF THINGS AT HOME, OR GET ALONG WITH OTHER PEOPLE: EXTREMELY DIFFICULT
2. FEELING DOWN, DEPRESSED OR HOPELESS: NEARLY EVERY DAY
4. FEELING TIRED OR HAVING LITTLE ENERGY: NEARLY EVERY DAY
SUM OF ALL RESPONSES TO PHQ9 QUESTIONS 1 AND 2: 6
3. TROUBLE FALLING OR STAYING ASLEEP OR SLEEPING TOO MUCH: SEVERAL DAYS
6. FEELING BAD ABOUT YOURSELF - OR THAT YOU ARE A FAILURE OR HAVE LET YOURSELF OR YOUR FAMILY DOWN: MORE THAN HALF THE DAYS
5. POOR APPETITE OR OVEREATING: NOT AT ALL
7. TROUBLE CONCENTRATING ON THINGS, SUCH AS READING THE NEWSPAPER OR WATCHING TELEVISION: NEARLY EVERY DAY
SUM OF ALL RESPONSES TO PHQ QUESTIONS 1-9: 18
9. THOUGHTS THAT YOU WOULD BE BETTER OFF DEAD, OR OF HURTING YOURSELF: MORE THAN HALF THE DAYS

## 2024-12-17 NOTE — PROGRESS NOTES
SUBJECTIVE  Patient ID: Miguelina Gloria is a 38 y.o. adult who presents for Patient is here for a salivary gland biopsy.    History: 34-year-old female referred by Dr. Cherelle Rico for evaluation of bilateral otalgia. Review of the chart the patient was seen in urgent care 10/26/2024 bilateral otalgia and impacted cerumen. The ear canals were irrigated and she was prescribed fluticasone nasal spray. She then saw her primary care doctor who started her on a course of omeprazole (11/20/2020). She has a previous upper endoscopy from 2017 which demonstrated a normal esophagus and Z line.     Patient reports that 10/26 she noted left aural fullness and right ear popping with swallowing. Despite use of fluticasone her left ear started popping as well as the right. Still having ear pain today. No preceding illnesses. No sick contacts. No history of ear surgery. No change in hearing, tinnitus, otorrhea. Rare dizziness; described as lightheadedness and imbalance. Does use a mouth guard nightly. Believes she grinds her teeth somewhat.     She reports that 11/20 she continued to have symptoms of acid reflux with coffee. Notes a sensation of water getting stuck in her throat. She reports that she has had some regurgitation of food and coffee in one case. Notes some issues with peanut butter as well as some other liquids. Is able to take her vitamin and allergy pills without issue. She had a previous GI work-up with upper endoscopy as noted above, but no definitive cause for persistent stomach pain was identified. She notes that she continues to have abdominal pains daily.     Update 3/13/2023: Now working full time at [employer]  Follow-up for chronic rhinitis and headache.  1. Reports that she will sometimes wake with globus sensation.  2. She notes that she finds that the combination of Nasacort/azelastine has been helpful for nasal congestion. No new concerns.  3. She reports persistent facial pressure although this remains  improved with current medications as prescribed by neurology. No significant exacerbations. Thinks that this may be influenced by wearing her several glasses.     Update 5/9/2024:    Follow-up for chronic rhinitis  Nasacort/azelastine nasal sprays are working well to control her Symptoms. She would prefer the combo.   No further issues to discuss today    Update 12/17/2024:  Here today for new issues. Getting work-up with neurology for POTS and possible peripheral neuropathies. Also recommended she have salivary gland biopsy to assess for possible Sjogren disease.  She does note history of dry mouth.    OBJECTIVE  Physical Exam  CONSTITUTIONAL: Well appearing female who appears stated age.  VOICE: Clear speech without hoarseness. No stridor nor stertor.  RESPIRATORY: Normal inspiration and expiration and chest wall expansion; no use of accessory muscles to breathe.  PSYCHIATRIC: Alert, appropriate mood and affect.   HEAD, FACE, AND SKIN: Symmetric facial feature.  ORAL: Healthy appearing mucosa; moist.    --------------------------------------------------  Procedure: Biopsy, lower lip salivary gland  Indication: Rule out Sjogren disease  Informed consent obtained: The risks, benefits, alternatives, and expectations were discussed with the patient, who wished to proceed.  Informed consent signed.  Anesthesia: Submucosal 1% lidocaine with 1:100,000 epinephrine    The lip was everted to allow for improved access to the mucosal surface.  Local anesthetic was injected along the mucosal surface of the left lower lip.  After an appropriate amount of time a 15 blade was used to make an initial 15 mm incision.  Using a combination of sharp and blunt dissection multiple minor salivary glands were removed from the submucosal space.  A branch of the mental nerve was identified and preserved.  Bleeding was controlled with saline irrigation.  The incision was then closed with 5-0 fast gut suture.    There were no complications  and the patient tolerated the procedure well.  --------------------------------------------------    ASSESSMENT/PLAN  Diagnoses and all orders for this visit:  POTS (postural orthostatic tachycardia syndrome)  -     Surgical Pathology Exam        38 y.o. adult  seen for multiple issues. Wants to try Ryaltris    0.  POTS, r/o Sjogren disease  The patient is currently getting worked up for POTS as a possible source of dizziness.  During workup neurology is interested in potential rule out of Sjogren disease as a contributing factor.  A lower lip biopsy was performed at today's visit.  Follow-up in 10 to 14 days for wound check.    1. Chronic/allergic rhinitis  Previously the patient had some mild nasal congestion and nasal dryness which has been well controlled with use of Fluticasone and Azelastine. In general she is happy with improvement in symptoms. However, she would like to have a combo nasal spray to minimize the number of sprays she is using. She has a history of nasal dryness, so we will proceed with Ryaltris (Olapatadine and Mometasone).    Follow-up as scheduled in one year.     This note was created using speech recognition transcription software. Despite proofreading, typographical errors may be present that affect the meaning of the content. Please contact my office with any questions.

## 2024-12-19 ENCOUNTER — TELEPHONE (OUTPATIENT)
Dept: PRIMARY CARE | Facility: CLINIC | Age: 39
End: 2024-12-19

## 2024-12-19 ENCOUNTER — APPOINTMENT (OUTPATIENT)
Dept: PRIMARY CARE | Facility: CLINIC | Age: 39
End: 2024-12-19
Payer: COMMERCIAL

## 2024-12-19 VITALS
WEIGHT: 293 LBS | DIASTOLIC BLOOD PRESSURE: 80 MMHG | SYSTOLIC BLOOD PRESSURE: 121 MMHG | BODY MASS INDEX: 52.61 KG/M2 | TEMPERATURE: 97.1 F | HEART RATE: 90 BPM

## 2024-12-19 DIAGNOSIS — K58.9 IRRITABLE BOWEL SYNDROME WITHOUT DIARRHEA: ICD-10-CM

## 2024-12-19 DIAGNOSIS — M54.40 CHRONIC MIDLINE LOW BACK PAIN WITH SCIATICA, SCIATICA LATERALITY UNSPECIFIED: ICD-10-CM

## 2024-12-19 DIAGNOSIS — G89.29 CHRONIC MIDLINE LOW BACK PAIN WITH SCIATICA, SCIATICA LATERALITY UNSPECIFIED: ICD-10-CM

## 2024-12-19 DIAGNOSIS — M79.7 FIBROMYALGIA: ICD-10-CM

## 2024-12-19 DIAGNOSIS — M25.562 CHRONIC PAIN OF BOTH KNEES: ICD-10-CM

## 2024-12-19 DIAGNOSIS — G89.29 CHRONIC PAIN OF BOTH KNEES: ICD-10-CM

## 2024-12-19 DIAGNOSIS — G43.109 OCULAR MIGRAINE: ICD-10-CM

## 2024-12-19 DIAGNOSIS — G90.A POTS (POSTURAL ORTHOSTATIC TACHYCARDIA SYNDROME): Primary | ICD-10-CM

## 2024-12-19 DIAGNOSIS — M35.3 POLYMYALGIA (MULTI): ICD-10-CM

## 2024-12-19 DIAGNOSIS — M25.561 CHRONIC PAIN OF BOTH KNEES: ICD-10-CM

## 2024-12-19 PROCEDURE — 99213 OFFICE O/P EST LOW 20 MIN: CPT | Performed by: FAMILY MEDICINE

## 2024-12-19 ASSESSMENT — PAIN SCALES - GENERAL: PAINLEVEL_OUTOF10: 6

## 2024-12-19 NOTE — PROGRESS NOTES
Subjective   Patient ID: Miguelina Gloria is a 38 y.o. adult who presents for Follow-up, Shoulder Pain (both), Knee Pain (both), and Back Pain (Right ankle ).  HPI    The patient is a 37 yo AA female with a history of obesity- BMI 51, ocular migraines, POTS, chronic back pain, fibromyalgia, IBS, OA knees, who is here for form completion for an accommodations for her medical conditions.     A review of system was completed.  All systems were reviewed and were normal, except for the ones that are listed in the HPI.    Objective   Physical Exam    Assessment/Plan   Problem List Items Addressed This Visit       Bilateral knee pain    Fibromyalgia    Ocular migraine    Polymyalgia (Multi)    POTS (postural orthostatic tachycardia syndrome) - Primary    Irritable bowel syndrome without diarrhea    Midline low back pain     Patient's forms were completed today and scanned into the chart..    Patient to return to office for her CPE.

## 2024-12-20 ENCOUNTER — TELEPHONE (OUTPATIENT)
Dept: PRIMARY CARE | Facility: CLINIC | Age: 39
End: 2024-12-20

## 2024-12-20 LAB
CLINICAL SIG UPDATED INFO: NORMAL
LABORATORY COMMENT REPORT: NORMAL
PATH REPORT.FINAL DX SPEC: NORMAL
PATH REPORT.GROSS SPEC: NORMAL
PATH REPORT.RELEVANT HX SPEC: NORMAL
PATH REPORT.TOTAL CANCER: NORMAL

## 2024-12-31 ENCOUNTER — OFFICE VISIT (OUTPATIENT)
Dept: OTOLARYNGOLOGY | Facility: CLINIC | Age: 39
End: 2024-12-31
Payer: COMMERCIAL

## 2024-12-31 VITALS
SYSTOLIC BLOOD PRESSURE: 123 MMHG | HEART RATE: 75 BPM | WEIGHT: 293 LBS | HEIGHT: 68 IN | DIASTOLIC BLOOD PRESSURE: 86 MMHG | BODY MASS INDEX: 44.41 KG/M2 | RESPIRATION RATE: 14 BRPM | OXYGEN SATURATION: 98 % | TEMPERATURE: 97.6 F

## 2024-12-31 DIAGNOSIS — G90.A POTS (POSTURAL ORTHOSTATIC TACHYCARDIA SYNDROME): Primary | ICD-10-CM

## 2024-12-31 PROCEDURE — 3008F BODY MASS INDEX DOCD: CPT | Performed by: STUDENT IN AN ORGANIZED HEALTH CARE EDUCATION/TRAINING PROGRAM

## 2024-12-31 PROCEDURE — 1036F TOBACCO NON-USER: CPT | Performed by: STUDENT IN AN ORGANIZED HEALTH CARE EDUCATION/TRAINING PROGRAM

## 2024-12-31 PROCEDURE — 99211 OFF/OP EST MAY X REQ PHY/QHP: CPT | Performed by: STUDENT IN AN ORGANIZED HEALTH CARE EDUCATION/TRAINING PROGRAM

## 2024-12-31 SDOH — ECONOMIC STABILITY: FOOD INSECURITY: WITHIN THE PAST 12 MONTHS, THE FOOD YOU BOUGHT JUST DIDN'T LAST AND YOU DIDN'T HAVE MONEY TO GET MORE.: NEVER TRUE

## 2024-12-31 SDOH — ECONOMIC STABILITY: FOOD INSECURITY: WITHIN THE PAST 12 MONTHS, YOU WORRIED THAT YOUR FOOD WOULD RUN OUT BEFORE YOU GOT MONEY TO BUY MORE.: NEVER TRUE

## 2024-12-31 ASSESSMENT — LIFESTYLE VARIABLES
HOW OFTEN DO YOU HAVE SIX OR MORE DRINKS ON ONE OCCASION: NEVER
HOW OFTEN DO YOU HAVE A DRINK CONTAINING ALCOHOL: NEVER
HOW MANY STANDARD DRINKS CONTAINING ALCOHOL DO YOU HAVE ON A TYPICAL DAY: PATIENT DOES NOT DRINK
AUDIT-C TOTAL SCORE: 0
SKIP TO QUESTIONS 9-10: 1

## 2024-12-31 ASSESSMENT — COLUMBIA-SUICIDE SEVERITY RATING SCALE - C-SSRS
1. IN THE PAST MONTH, HAVE YOU WISHED YOU WERE DEAD OR WISHED YOU COULD GO TO SLEEP AND NOT WAKE UP?: NO
6. HAVE YOU EVER DONE ANYTHING, STARTED TO DO ANYTHING, OR PREPARED TO DO ANYTHING TO END YOUR LIFE?: NO
2. HAVE YOU ACTUALLY HAD ANY THOUGHTS OF KILLING YOURSELF?: NO

## 2024-12-31 ASSESSMENT — PAIN SCALES - GENERAL: PAINLEVEL_OUTOF10: 0-NO PAIN

## 2024-12-31 ASSESSMENT — ENCOUNTER SYMPTOMS
LOSS OF SENSATION IN FEET: 0
DEPRESSION: 1
OCCASIONAL FEELINGS OF UNSTEADINESS: 1

## 2024-12-31 NOTE — PROGRESS NOTES
SUBJECTIVE  Patient ID: Miguelina Gloria is a 39 y.o. adult who presents for Follow-up.    History: 34-year-old female referred by Dr. Cherelle Rico for evaluation of bilateral otalgia. Review of the chart the patient was seen in urgent care 10/26/2024 bilateral otalgia and impacted cerumen. The ear canals were irrigated and she was prescribed fluticasone nasal spray. She then saw her primary care doctor who started her on a course of omeprazole (11/20/2020). She has a previous upper endoscopy from 2017 which demonstrated a normal esophagus and Z line.     Patient reports that 10/26 she noted left aural fullness and right ear popping with swallowing. Despite use of fluticasone her left ear started popping as well as the right. Still having ear pain today. No preceding illnesses. No sick contacts. No history of ear surgery. No change in hearing, tinnitus, otorrhea. Rare dizziness; described as lightheadedness and imbalance. Does use a mouth guard nightly. Believes she grinds her teeth somewhat.     She reports that 11/20 she continued to have symptoms of acid reflux with coffee. Notes a sensation of water getting stuck in her throat. She reports that she has had some regurgitation of food and coffee in one case. Notes some issues with peanut butter as well as some other liquids. Is able to take her vitamin and allergy pills without issue. She had a previous GI work-up with upper endoscopy as noted above, but no definitive cause for persistent stomach pain was identified. She notes that she continues to have abdominal pains daily.     Update 3/13/2023: Now working full time at [employer]  Follow-up for chronic rhinitis and headache.  1. Reports that she will sometimes wake with globus sensation.  2. She notes that she finds that the combination of Nasacort/azelastine has been helpful for nasal congestion. No new concerns.  3. She reports persistent facial pressure although this remains improved with current medications  as prescribed by neurology. No significant exacerbations. Thinks that this may be influenced by wearing her several glasses.     Update 5/9/2024:    Follow-up for chronic rhinitis  Nasacort/azelastine nasal sprays are working well to control her Symptoms. She would prefer the combo.   No further issues to discuss today    Update 12/17/2024:  Here today for new issues. Getting work-up with neurology for POTS and possible peripheral neuropathies. Also recommended she have salivary gland biopsy to assess for possible Sjogren disease.  She does note history of dry mouth.    Update 12/31/2024:  Postop visit for wound check.  Reports that she did well following lower lip salivary gland biopsy but has had some numbness and tingling of the lower lip.  Pathology has resulted.    OBJECTIVE  Physical Exam  CONSTITUTIONAL: Well appearing female who appears stated age.  VOICE: Clear speech without hoarseness. No stridor nor stertor.  RESPIRATORY: Normal inspiration and expiration and chest wall expansion; no use of accessory muscles to breathe.  PSYCHIATRIC: Alert, appropriate mood and affect.   HEAD, FACE, AND SKIN: Symmetric facial feature.  ORAL: Healthy appearing mucosa; moist.  Left lower lip mucosal incision fully healed.  No underlying edema nor scar appreciated.    ASSESSMENT/PLAN  Diagnoses and all orders for this visit:  POTS (postural orthostatic tachycardia syndrome)    39 y.o. adult  seen for multiple issues.    0.  POTS, r/o Sjogren disease  The patient is currently getting worked up for POTS as a possible source of dizziness.  During workup neurology is interested in potential rule out of Sjogren disease as a contributing factor.  Lower lip biopsy was performed 12/17/2024; final pathology consistent with nonspecific sialoadenitis.  Patient healing well.  Discussed typical recovery of peripheral nerves.  Reassurance provided.    1. Chronic/allergic rhinitis  Previously the patient had some mild nasal congestion and  nasal dryness which has been well controlled with use of Fluticasone and Azelastine. In general she is happy with improvement in symptoms. However, she would like to have a combo nasal spray to minimize the number of sprays she is using. She has a history of nasal dryness, so we will proceed with Ryaltris (Olapatadine and Mometasone).    Follow-up as scheduled.     This note was created using speech recognition transcription software. Despite proofreading, typographical errors may be present that affect the meaning of the content. Please contact my office with any questions.

## 2025-01-02 ENCOUNTER — APPOINTMENT (OUTPATIENT)
Dept: NEUROLOGY | Facility: HOSPITAL | Age: 40
End: 2025-01-02
Payer: COMMERCIAL

## 2025-01-02 ENCOUNTER — SPECIALTY PHARMACY (OUTPATIENT)
Dept: PHARMACY | Facility: CLINIC | Age: 40
End: 2025-01-02

## 2025-01-02 DIAGNOSIS — G89.29 OTHER CHRONIC PAIN: Primary | ICD-10-CM

## 2025-01-02 DIAGNOSIS — G90.A POTS (POSTURAL ORTHOSTATIC TACHYCARDIA SYNDROME): ICD-10-CM

## 2025-01-02 PROCEDURE — RXMED WILLOW AMBULATORY MEDICATION CHARGE

## 2025-01-02 RX ORDER — DULOXETIN HYDROCHLORIDE 30 MG/1
CAPSULE, DELAYED RELEASE ORAL
Qty: 14 CAPSULE | Refills: 0 | Status: SHIPPED | OUTPATIENT
Start: 2025-01-02

## 2025-01-02 NOTE — PROGRESS NOTES
Per Dr. Contreras we discussed the weaning schedule for this patient to stop her Cymbalta 7 days before her autonomic testing on January 28. We will order some smaller 30 mg tablets for her to do this wean with. Additionally, patient instructed to stop her biotin supplement with b-vitamins since her B6 levels were elevated. We will recheck the level in 6 months.

## 2025-01-03 ENCOUNTER — PHARMACY VISIT (OUTPATIENT)
Dept: PHARMACY | Facility: CLINIC | Age: 40
End: 2025-01-03
Payer: MEDICARE

## 2025-01-08 DIAGNOSIS — G43.119 INTRACTABLE MIGRAINE WITH AURA WITHOUT STATUS MIGRAINOSUS: ICD-10-CM

## 2025-01-09 ENCOUNTER — APPOINTMENT (OUTPATIENT)
Dept: PRIMARY CARE | Facility: CLINIC | Age: 40
End: 2025-01-09
Payer: COMMERCIAL

## 2025-01-09 VITALS
WEIGHT: 293 LBS | HEART RATE: 96 BPM | TEMPERATURE: 97.5 F | SYSTOLIC BLOOD PRESSURE: 139 MMHG | DIASTOLIC BLOOD PRESSURE: 81 MMHG | BODY MASS INDEX: 53.07 KG/M2

## 2025-01-09 DIAGNOSIS — G43.109 OCULAR MIGRAINE: Primary | ICD-10-CM

## 2025-01-09 DIAGNOSIS — G90.A POTS (POSTURAL ORTHOSTATIC TACHYCARDIA SYNDROME): ICD-10-CM

## 2025-01-09 DIAGNOSIS — M79.7 FIBROMYALGIA: ICD-10-CM

## 2025-01-09 PROCEDURE — 99213 OFFICE O/P EST LOW 20 MIN: CPT | Performed by: FAMILY MEDICINE

## 2025-01-09 RX ORDER — GALCANEZUMAB 120 MG/ML
INJECTION, SOLUTION SUBCUTANEOUS
Qty: 1 EACH | Refills: 0 | Status: SHIPPED | OUTPATIENT
Start: 2025-01-09

## 2025-01-09 ASSESSMENT — PAIN SCALES - GENERAL: PAINLEVEL_OUTOF10: 6

## 2025-01-09 NOTE — PROGRESS NOTES
Subjective   Patient ID: Miguelina Gloria is a 39 y.o. adult who presents for Follow-up (paperwork), Hip Pain (Both ), Knee Pain (Both ), Shoulder Pain (both), Back Pain, and Ankle Pain (Right ).  HPI  The patient is a 38 yo AA female with a history of obesity- BMI 51, ocular migraines, POTS, chronic back pain, fibromyalgia, IBS, OA knees, who is here for form completion for her FMLA.    A review of system was completed.  All systems were reviewed and were normal, except for the ones that are listed in the HPI.    Objective   Physical Exam    Assessment/Plan   Problem List Items Addressed This Visit       Fibromyalgia    Ocular migraine - Primary    POTS (postural orthostatic tachycardia syndrome)   FMLA form completed on the basis of the above diagnosis.      Patient to return to office for your CPE.

## 2025-01-21 ENCOUNTER — APPOINTMENT (OUTPATIENT)
Dept: NEUROLOGY | Facility: HOSPITAL | Age: 40
End: 2025-01-21
Payer: COMMERCIAL

## 2025-01-23 ENCOUNTER — APPOINTMENT (OUTPATIENT)
Dept: OTOLARYNGOLOGY | Facility: CLINIC | Age: 40
End: 2025-01-23
Payer: COMMERCIAL

## 2025-01-28 ENCOUNTER — HOSPITAL ENCOUNTER (OUTPATIENT)
Dept: NEUROLOGY | Facility: HOSPITAL | Age: 40
Discharge: HOME | End: 2025-01-28
Payer: COMMERCIAL

## 2025-01-28 ENCOUNTER — TELEPHONE (OUTPATIENT)
Dept: NEUROLOGY | Facility: CLINIC | Age: 40
End: 2025-01-28

## 2025-01-28 DIAGNOSIS — G90.A POTS (POSTURAL ORTHOSTATIC TACHYCARDIA SYNDROME): ICD-10-CM

## 2025-01-28 DIAGNOSIS — R20.9 DISTURBANCE OF SKIN SENSATION: Primary | ICD-10-CM

## 2025-01-28 DIAGNOSIS — M54.17 LUMBOSACRAL RADICULOPATHY: ICD-10-CM

## 2025-01-28 DIAGNOSIS — M54.12 CERVICAL RADICULOPATHY: ICD-10-CM

## 2025-01-28 PROCEDURE — 95923 AUTONOMIC NRV SYST FUNJ TEST: CPT | Performed by: PSYCHIATRY & NEUROLOGY

## 2025-01-28 PROCEDURE — 95886 MUSC TEST DONE W/N TEST COMP: CPT | Performed by: PSYCHIATRY & NEUROLOGY

## 2025-01-28 PROCEDURE — 95924 ANS PARASYMP & SYMP W/TILT: CPT | Performed by: PSYCHIATRY & NEUROLOGY

## 2025-01-28 PROCEDURE — 95911 NRV CNDJ TEST 9-10 STUDIES: CPT | Performed by: PSYCHIATRY & NEUROLOGY

## 2025-01-28 NOTE — TELEPHONE ENCOUNTER
Called patient to discuss results of EMG and autonomic testing from today and update her on plan. Explained that EMG showed evidence of pinched nerve in the neck and low back which could explain some of her symptoms and as such Dr. Contreras has recommended starting with physical therapy. Patient explained that she has tried physical therapy last year which made her pain worse, so she had to stop it. She was seen by pain management and would like a referral to a different pain management provider. Referral placed.    Additionally I explained that EMG had some evidence suggestive but not definite of myopathy. We will monitor her symptoms for the time being.     Autonomic testing today showed presence of a symptomatic accentuated orthostatic tachycardia, not sufficient for the diagnosis of Postural Orthostatic Tachycardia Syndrome (POTS), and there is no evidence of a postganglionic sympathetic sudomotor abnormality like that seen in autonomic/small fiber neuropathy.     I shared that we want to continue to help her and have tried our best to find an earlier appointment. Our  will call her about an appointment on April 3rd to see if that works for her schedule, as that is the earliest one available at this time.    Referral for pain management placed.  Zahira Gaytan MD  Neuromuscular Fellow

## 2025-01-29 DIAGNOSIS — M54.12 CERVICAL RADICULOPATHY: Primary | ICD-10-CM

## 2025-01-29 DIAGNOSIS — M54.17 LUMBOSACRAL RADICULOPATHY: ICD-10-CM

## 2025-01-30 ENCOUNTER — APPOINTMENT (OUTPATIENT)
Dept: NEUROLOGY | Facility: CLINIC | Age: 40
End: 2025-01-30
Payer: COMMERCIAL

## 2025-01-31 ENCOUNTER — OFFICE VISIT (OUTPATIENT)
Dept: NEUROLOGY | Facility: CLINIC | Age: 40
End: 2025-01-31
Payer: COMMERCIAL

## 2025-01-31 VITALS
WEIGHT: 293 LBS | HEIGHT: 68 IN | DIASTOLIC BLOOD PRESSURE: 95 MMHG | BODY MASS INDEX: 44.41 KG/M2 | SYSTOLIC BLOOD PRESSURE: 152 MMHG | HEART RATE: 86 BPM

## 2025-01-31 DIAGNOSIS — G43.109 OCULAR MIGRAINE: ICD-10-CM

## 2025-01-31 DIAGNOSIS — G43.119 INTRACTABLE MIGRAINE WITH AURA WITHOUT STATUS MIGRAINOSUS: Primary | ICD-10-CM

## 2025-01-31 PROCEDURE — 99214 OFFICE O/P EST MOD 30 MIN: CPT | Performed by: NURSE PRACTITIONER

## 2025-01-31 PROCEDURE — 3008F BODY MASS INDEX DOCD: CPT | Performed by: NURSE PRACTITIONER

## 2025-01-31 PROCEDURE — 1036F TOBACCO NON-USER: CPT | Performed by: NURSE PRACTITIONER

## 2025-01-31 RX ORDER — METHYLPREDNISOLONE 4 MG/1
TABLET ORAL
Qty: 21 TABLET | Refills: 0 | Status: SHIPPED | OUTPATIENT
Start: 2025-01-31 | End: 2025-02-06

## 2025-01-31 RX ORDER — PROPRANOLOL HYDROCHLORIDE 20 MG/1
20 TABLET ORAL 3 TIMES DAILY
Qty: 90 TABLET | Refills: 5 | Status: SHIPPED | OUTPATIENT
Start: 2025-01-31

## 2025-01-31 ASSESSMENT — PATIENT HEALTH QUESTIONNAIRE - PHQ9
SUM OF ALL RESPONSES TO PHQ9 QUESTIONS 1 & 2: 2
1. LITTLE INTEREST OR PLEASURE IN DOING THINGS: SEVERAL DAYS
2. FEELING DOWN, DEPRESSED OR HOPELESS: SEVERAL DAYS

## 2025-01-31 ASSESSMENT — ENCOUNTER SYMPTOMS
OCCASIONAL FEELINGS OF UNSTEADINESS: 1
DEPRESSION: 1
LOSS OF SENSATION IN FEET: 0

## 2025-01-31 NOTE — PROGRESS NOTES
Chief Complaint   Patient presents with    Follow-up     FOLLOW UP       Subjective   HPI  Miguelina Gloria is a 39 y.o. year old adult who presents with chief complaint Intractable migraine with aura without status migrainosus [G43.119]    Miguelina is experiencing  5-8 HA days per month, 5-8 of the HAs meet migraine criteria.   Triggers include  bright lights .  Aura  The headaches are usually severe and stabbing  and are located above left eye, generally unilateral.   The patient rates the most severe headaches a 10 in intensity.   Generally, headaches last about 1 hours in duration.   Associated photophobia, phonophobia, and vestibular symptoms.   The current rescue medications work within  30 minutes and decreased the headache by 100%. The patient  repeats treatment with rescue medication.       Current/ past HA treatments:  Preventive:  Propranolol  Duloxetine  Gabapentin  Topiramate      Rescue:  Acetaminophen  Ibuprofen  Sumatriptan  Rizatriptan      Current Outpatient Medications:     amitriptyline (Elavil) 10 mg tablet, TAKE 1 TABLET BY MOUTH EVERYDAY AT BEDTIME, Disp: 90 tablet, Rfl: 2    azelastine (Astelin) 137 mcg (0.1 %) nasal spray, Administer 1 spray into each nostril 2 times a day. Use in each nostril as directed, Disp: 30 mL, Rfl: 11    calcium carbonate-vitamin D3 600 mg-20 mcg (800 unit) tablet, Take by mouth., Disp: , Rfl:     DULoxetine (Cymbalta) 30 mg DR capsule, Do not crush or chew.  Take 30 mg for 1 week before stopping your Cymbalta 7 days before your autonomic testing. Your testing is scheduled for January 28. Start the 30 mg on January 14 and stop the medication January 21. Restart your Cymbalta after testing to 30 mg for one week and then back to your 60 mg dose., Disp: 14 capsule, Rfl: 0    ferrous fumarate/ascorbic acid (FERROUS FUMARATE-VITAMIN C ORAL), Take 1 tablet by mouth once daily., Disp: , Rfl:     ferrous sulfate 325 (65 Fe) MG tablet, Take 1 tablet (325 mg) by mouth 2 times a  day., Disp: , Rfl:     fexofenadine-pseudoephedrine (Allegra-D 24 Hour) 180-240 mg 24 hr tablet, Take 1 tablet by mouth once daily., Disp: , Rfl:     FIBER, CALCIUM POLYCARBOPHIL, ORAL, Take 5 g by mouth once daily., Disp: , Rfl:     galcanezumab (Emgality Pen) 120 mg/mL auto-injector, INJECT 1 PEN UNDER THE SKIN 1 TIME A MONTH, Disp: 1 each, Rfl: 0    L.acid,ferm,nichelle,rha-B.bif,long (Controlled Delivery Probiotic) 126 mg (2 billion cell) tablet,delayed and ext.release, Take 1 tablet by mouth once daily., Disp: , Rfl:     lactobacillus acidophilus capsule, Take 1 capsule by mouth once daily. Duplicate. Patient only takes 1 Probiotic, Disp: , Rfl:     Linzess 145 mcg capsule, TAKE 1 CAPSULE BY MOUTH EVERY DAY, Disp: 90 capsule, Rfl: 2    magnesium 250 mg tablet, Magnesium 250 MG Oral Tablet Refills: 0  Start: 19-Apr-2023, Disp: , Rfl:     multivit-minerals/folic acid (CENTRUM ADULTS ORAL), Take 1 tablet by mouth once daily., Disp: , Rfl:     multivit-mins no.11-folic acid (Dialyvite 5000) 5 mg tablet, Take by mouth once daily. duplicate, Disp: , Rfl:     rimegepant (NURTEC) 75 mg tablet,disintegrating, Allow one (1) tablet to dissolve on or under the tongue once daily as needed for migraine. Do not take more than one (1) tablet in a 24 hour period., Disp: 16 tablet, Rfl: 2    triamcinolone (Kenalog) 0.1 % ointment, Apply 1 Application topically 2 times a day., Disp: , Rfl:     triamcinolone (Nasacort) 55 mcg nasal inhaler, Nasal Allergy 24 Hour 55 MCG/ACT Nasal Aerosol Refills: 0, Disp: , Rfl:     DULoxetine (Cymbalta) 60 mg DR capsule, Take 1 capsule (60 mg) by mouth once daily. (Patient not taking: Reported on 1/31/2025), Disp: 90 capsule, Rfl: 3    famotidine (Pepcid) 20 mg tablet, Take 1 tablet (20 mg) by mouth 2 times a day as needed. (Patient not taking: Reported on 1/31/2025), Disp: , Rfl:     ibuprofen 600 mg tablet, Take by mouth 3 times a day as needed. (Patient not taking: Reported on 1/31/2025), Disp: ,  Rfl:     propranolol (Inderal) 20 mg tablet, Take 1 tablet (20 mg) by mouth 3 times a day., Disp: 90 tablet, Rfl: 5    Allergies   Allergen Reactions    Bee Pollen Other    Kiwi Unknown    Pineapple Unknown    Silver Nitrate Hives, Itching and Swelling       Past Medical History:   Diagnosis Date    ADHD (attention deficit hyperactivity disorder) 2022    Anxiety 2023    Cluster headache 2018    Depression 2023    Difficulty walking 2018    Dysphagia 2020    Fibromyalgia, primary 2023    Headache, tension-type 2003    HL (hearing loss) 2021    Impacted cerumen, bilateral 10/26/2020    Bilateral impacted cerumen    Migraine, unspecified, not intractable, without status migrainosus     Migraines    Otalgia, bilateral 11/02/2020    Acute otalgia, bilateral    Other allergy status, other than to drugs and biological substances     History of environmental allergies    Other conditions influencing health status     Intentional weight loss    Personal history of diseases of the blood and blood-forming organs and certain disorders involving the immune mechanism     History of anemia    Personal history of other diseases of the digestive system 11/20/2020    History of gastroesophageal reflux (GERD)    Personal history of other diseases of the female genital tract 05/13/2019    History of vaginal discharge    Personal history of other diseases of the nervous system and sense organs     History of ear infections    Weakness of limb 2021     Past Surgical History:   Procedure Laterality Date    OTHER SURGICAL HISTORY  12/14/2020    No history of surgery     Family History   Problem Relation Name Age of Onset    Eczema Mother      Stroke Father Richard     Lung cancer Father Richard     ADD / ADHD Father Richard     Other (environmental allergies) Other      Diabetes Other      Hypertension Other      Stroke Other Uncle     Heart disease Other Aunt         hole in heart    Heart disease Cousin          hole in herat    Dementia  "Maternal Grandmother Terrell     Stroke Maternal Grandmother Terrell     Dementia Mother's Brother Yang     Fibromyalgia Mother's Brother Yang     Stroke Mother's Brother Yang     Depression Sister Rachelle     Intellectual Disability Sister Rachelle      Social History     Tobacco Use    Smoking status: Never    Smokeless tobacco: Never   Substance Use Topics    Alcohol use: Never     Social History     Substance and Sexual Activity   Drug Use Never        ROS  As noted in HPI, otherwise all other systems have been reviewed are negative for complaint.     Objective   General Appearance: Miguelina is well-developed, well-nourished, 39 y.o. year old adult, in no acute distress. Makes good eye contact, is alert, interactive, and cooperative. Demonstrates recent & remote memory recall. Subjective information consistent with objective assessment.     Vitals:    01/31/25 1518   BP: (!) 152/95   Pulse: 86   Weight: (!) 159 kg (350 lb)   Height: 1.727 m (5' 8\")       Lab Results   Component Value Date    WBC 5.9 09/07/2024    RBC 5.01 09/07/2024    HGB 11.8 (L) 09/07/2024    HCT 39.1 09/07/2024     09/07/2024     09/07/2024    K 4.5 09/07/2024     09/07/2024    BUN 9 09/07/2024    CREATININE 0.79 09/07/2024    EGFR >90 09/07/2024    CALCIUM 8.6 09/07/2024    ALKPHOS 79 06/19/2023    AST 25 06/19/2023    ALT 17 06/19/2023    NVRLFXIR30 640 09/07/2024    VITD25 33 09/12/2022    HGBA1C 5.7 (A) 06/19/2023    CHOL 146 09/12/2022    HDL 64.8 09/12/2022    TRIG 68 09/12/2022    TSH 0.85 09/07/2024        Mental Status  Patient Health Questionnaire-2 Score: 2   No data recorded       Neurological Exam  Cranial Nerves  CN III, IV, VI: Extraocular movements intact bilaterally. Normal lids and orbits bilaterally.  CN VII: Full and symmetric facial movement.  CN VIII: Hearing is normal.      RECORDS REVIEW:      Assessment & Plan  Intractable migraine with aura without status migrainosus    Orders:    " propranolol (Inderal) 20 mg tablet; Take 1 tablet (20 mg) by mouth 3 times a day.    rimegepant (NURTEC) 75 mg tablet,disintegrating; Allow one (1) tablet to dissolve on or under the tongue once daily as needed for migraine. Do not take more than one (1) tablet in a 24 hour period.    methylPREDNISolone (Medrol Dospak) 4 mg tablets; Follow schedule on package instructions    Ocular migraine              ASSESSMENT/PLAN:  Continue Emgality for preventative treatment.  Continue propranolol for preventative treatment.  Continue Nurtec for abortive treatment.  Follow-up in 1 year or sooner if needed.    Jessica Wren, APRN-CNP

## 2025-01-31 NOTE — ASSESSMENT & PLAN NOTE
Orders:    propranolol (Inderal) 20 mg tablet; Take 1 tablet (20 mg) by mouth 3 times a day.    rimegepant (NURTEC) 75 mg tablet,disintegrating; Allow one (1) tablet to dissolve on or under the tongue once daily as needed for migraine. Do not take more than one (1) tablet in a 24 hour period.    methylPREDNISolone (Medrol Dospak) 4 mg tablets; Follow schedule on package instructions

## 2025-02-03 ENCOUNTER — SPECIALTY PHARMACY (OUTPATIENT)
Dept: PHARMACY | Facility: CLINIC | Age: 40
End: 2025-02-03

## 2025-02-03 PROCEDURE — RXMED WILLOW AMBULATORY MEDICATION CHARGE

## 2025-02-05 ENCOUNTER — PHARMACY VISIT (OUTPATIENT)
Dept: PHARMACY | Facility: CLINIC | Age: 40
End: 2025-02-05
Payer: MEDICARE

## 2025-02-10 ENCOUNTER — APPOINTMENT (OUTPATIENT)
Dept: PRIMARY CARE | Facility: CLINIC | Age: 40
End: 2025-02-10
Payer: COMMERCIAL

## 2025-02-11 DIAGNOSIS — R52 CHRONIC PAIN OF MULTIPLE SITES: ICD-10-CM

## 2025-02-11 DIAGNOSIS — M79.7 FIBROMYALGIA: ICD-10-CM

## 2025-02-11 DIAGNOSIS — G89.29 CHRONIC PAIN OF MULTIPLE SITES: ICD-10-CM

## 2025-02-11 DIAGNOSIS — G90.A POTS (POSTURAL ORTHOSTATIC TACHYCARDIA SYNDROME): ICD-10-CM

## 2025-02-11 RX ORDER — DULOXETIN HYDROCHLORIDE 60 MG/1
60 CAPSULE, DELAYED RELEASE ORAL DAILY
Qty: 90 CAPSULE | Refills: 3 | Status: SHIPPED | OUTPATIENT
Start: 2025-02-11

## 2025-02-11 NOTE — TELEPHONE ENCOUNTER
Prescription Request        Has The Patient Been Identified By Name And Date Of Birth:Yes    RX Requestor:Patient    Date of Last Refill:05/20/24    Date Of Last Office Visit:10/16/24    Date Of Future Office Visit: None

## 2025-02-13 ENCOUNTER — OFFICE VISIT (OUTPATIENT)
Dept: PAIN MEDICINE | Facility: CLINIC | Age: 40
End: 2025-02-13
Payer: COMMERCIAL

## 2025-02-13 VITALS
OXYGEN SATURATION: 100 % | TEMPERATURE: 98.1 F | RESPIRATION RATE: 18 BRPM | DIASTOLIC BLOOD PRESSURE: 90 MMHG | SYSTOLIC BLOOD PRESSURE: 131 MMHG | HEIGHT: 68 IN | HEART RATE: 82 BPM | WEIGHT: 293 LBS | BODY MASS INDEX: 44.41 KG/M2

## 2025-02-13 DIAGNOSIS — M54.17 LUMBOSACRAL RADICULOPATHY: ICD-10-CM

## 2025-02-13 DIAGNOSIS — R20.9 DISTURBANCE OF SKIN SENSATION: ICD-10-CM

## 2025-02-13 DIAGNOSIS — M54.12 CERVICAL RADICULOPATHY: ICD-10-CM

## 2025-02-13 PROCEDURE — 99214 OFFICE O/P EST MOD 30 MIN: CPT | Performed by: ANESTHESIOLOGY

## 2025-02-13 PROCEDURE — 1036F TOBACCO NON-USER: CPT | Performed by: ANESTHESIOLOGY

## 2025-02-13 PROCEDURE — 3008F BODY MASS INDEX DOCD: CPT | Performed by: ANESTHESIOLOGY

## 2025-02-13 RX ORDER — CELECOXIB 200 MG/1
200 CAPSULE ORAL DAILY
Qty: 30 CAPSULE | Refills: 0 | Status: SHIPPED | OUTPATIENT
Start: 2025-02-13

## 2025-02-13 RX ORDER — GABAPENTIN 300 MG/1
300-600 CAPSULE ORAL NIGHTLY
Qty: 90 CAPSULE | Refills: 1 | Status: SHIPPED | OUTPATIENT
Start: 2025-02-13 | End: 2025-05-14

## 2025-02-13 SDOH — ECONOMIC STABILITY: FOOD INSECURITY: WITHIN THE PAST 12 MONTHS, YOU WORRIED THAT YOUR FOOD WOULD RUN OUT BEFORE YOU GOT MONEY TO BUY MORE.: NEVER TRUE

## 2025-02-13 SDOH — ECONOMIC STABILITY: FOOD INSECURITY: WITHIN THE PAST 12 MONTHS, THE FOOD YOU BOUGHT JUST DIDN'T LAST AND YOU DIDN'T HAVE MONEY TO GET MORE.: NEVER TRUE

## 2025-02-13 ASSESSMENT — PATIENT HEALTH QUESTIONNAIRE - PHQ9
6. FEELING BAD ABOUT YOURSELF - OR THAT YOU ARE A FAILURE OR HAVE LET YOURSELF OR YOUR FAMILY DOWN: NEARLY EVERY DAY
7. TROUBLE CONCENTRATING ON THINGS, SUCH AS READING THE NEWSPAPER OR WATCHING TELEVISION: NEARLY EVERY DAY
3. TROUBLE FALLING OR STAYING ASLEEP OR SLEEPING TOO MUCH: NEARLY EVERY DAY
9. THOUGHTS THAT YOU WOULD BE BETTER OFF DEAD, OR OF HURTING YOURSELF: NOT AT ALL
2. FEELING DOWN, DEPRESSED OR HOPELESS: NEARLY EVERY DAY
4. FEELING TIRED OR HAVING LITTLE ENERGY: NEARLY EVERY DAY
1. LITTLE INTEREST OR PLEASURE IN DOING THINGS: NEARLY EVERY DAY
5. POOR APPETITE OR OVEREATING: NEARLY EVERY DAY
SUM OF ALL RESPONSES TO PHQ QUESTIONS 1-9: 21
8. MOVING OR SPEAKING SO SLOWLY THAT OTHER PEOPLE COULD HAVE NOTICED. OR THE OPPOSITE, BEING SO FIGETY OR RESTLESS THAT YOU HAVE BEEN MOVING AROUND A LOT MORE THAN USUAL: NOT AT ALL
10. IF YOU CHECKED OFF ANY PROBLEMS, HOW DIFFICULT HAVE THESE PROBLEMS MADE IT FOR YOU TO DO YOUR WORK, TAKE CARE OF THINGS AT HOME, OR GET ALONG WITH OTHER PEOPLE: VERY DIFFICULT
SUM OF ALL RESPONSES TO PHQ9 QUESTIONS 1 AND 2: 6

## 2025-02-13 ASSESSMENT — ENCOUNTER SYMPTOMS
NUMBNESS: 1
DIFFICULTY URINATING: 0
NECK PAIN: 1
FEVER: 0
ADENOPATHY: 0
BACK PAIN: 1
EYE PAIN: 0
DEPRESSION: 1
LOSS OF SENSATION IN FEET: 1
OCCASIONAL FEELINGS OF UNSTEADINESS: 1
WEAKNESS: 1
SHORTNESS OF BREATH: 0
BLOOD IN STOOL: 0

## 2025-02-13 ASSESSMENT — LIFESTYLE VARIABLES
TOTAL SCORE: 3
AUDIT-C TOTAL SCORE: 0
HOW OFTEN DO YOU HAVE SIX OR MORE DRINKS ON ONE OCCASION: NEVER
HOW OFTEN DO YOU HAVE A DRINK CONTAINING ALCOHOL: NEVER
HOW MANY STANDARD DRINKS CONTAINING ALCOHOL DO YOU HAVE ON A TYPICAL DAY: PATIENT DOES NOT DRINK
SKIP TO QUESTIONS 9-10: 1

## 2025-02-13 ASSESSMENT — PAIN - FUNCTIONAL ASSESSMENT: PAIN_FUNCTIONAL_ASSESSMENT: 0-10

## 2025-02-13 ASSESSMENT — PAIN SCALES - GENERAL
PAINLEVEL_OUTOF10: 4
PAINLEVEL_OUTOF10: 4

## 2025-02-13 ASSESSMENT — PAIN DESCRIPTION - DESCRIPTORS: DESCRIPTORS: ACHING;BURNING;PRESSURE

## 2025-02-13 NOTE — PROGRESS NOTES
Chief Complain    New Patient Visit (For generalized body pain, was referred for neck and back pain since 11/23, had a fall in the shower. Deny neck and back surgeries. Have images on file. Currently taking otc pain relief and massages. Seen Dr. Pearl last year, wanted to increase Cymbalta and amitriptyline, Rheumatologist did not agree. Was reffered by Ibeth)    History Of Present Illness  Miguelina Gloria is a 39 y.o. adult here for evaluation of neck, mid and low back pain. The patient has been experiencing these symptoms for November of 2023. The patient describes the pain as sharp, dull, radiating, stabbing. The patient's current pain score is 4 on a scale from 0-10. The pain is worsened by bending forward, standing, and walking and is alleviated by medications Tylenol,massage. Since the start of the symptoms the pain has been unchanged.    The patient denies any fever, chills, weight loss,  bladder/ bowel incontinence, history of cancer, history of IV drug abuse, recent trauma.      Past Medical History  Miguelina has a past medical history of ADHD (attention deficit hyperactivity disorder) (2022), Anxiety (2023), Cluster headache (2018), Depression (2023), Difficulty walking (2018), Dysphagia (2020), Fibromyalgia, primary (2023), Headache, tension-type (2003), HL (hearing loss) (2021), Impacted cerumen, bilateral (10/26/2020), Migraine, unspecified, not intractable, without status migrainosus, Otalgia, bilateral (11/02/2020), Other allergy status, other than to drugs and biological substances, Other conditions influencing health status, Personal history of diseases of the blood and blood-forming organs and certain disorders involving the immune mechanism, Personal history of other diseases of the digestive system (11/20/2020), Personal history of other diseases of the female genital tract (05/13/2019), Personal history of other diseases of the nervous system and sense organs, and Weakness of limb  (2021).    Surgical History  Miguelina has a past surgical history that includes Other surgical history (12/14/2020).    Social History  Miguelina reports that Miguelina has never smoked. Miguelina has never used smokeless tobacco. Miguelina reports that Miguelina does not drink alcohol and does not use drugs.    Family History  Family History   Problem Relation Name Age of Onset    Eczema Mother      Stroke Father Richard     Lung cancer Father Richard     ADD / ADHD Father Richard     Other (environmental allergies) Other      Diabetes Other      Hypertension Other      Stroke Other Uncle     Heart disease Other Aunt         hole in heart    Heart disease Cousin          hole in herat    Dementia Maternal Grandmother Terrell     Stroke Maternal Grandmother Terrell     Dementia Mother's Brother Yang     Fibromyalgia Mother's Brother Yang     Stroke Mother's Brother Yang     Depression Sister Rachelle     Intellectual Disability Sister Rachelle         Allergies  Bee pollen, Kiwi, Pineapple, and Silver nitrate    Review of Systems  Review of Systems   Constitutional:  Negative for fever.   HENT:  Negative for ear pain.    Eyes:  Negative for pain.   Respiratory:  Negative for shortness of breath.    Cardiovascular:  Negative for chest pain.   Gastrointestinal:  Negative for blood in stool.   Endocrine: Negative for cold intolerance.   Genitourinary:  Negative for difficulty urinating.   Musculoskeletal:  Positive for back pain and neck pain.   Skin:  Negative for rash.   Allergic/Immunologic: Positive for food allergies.   Neurological:  Positive for weakness and numbness.   Hematological:  Negative for adenopathy.   Psychiatric/Behavioral:  Negative for suicidal ideas.         Physical Exam  Physical Exam  Constitutional:       Appearance: Miguelina is obese.   HENT:      Head: Normocephalic and atraumatic.   Eyes:      Extraocular Movements: Extraocular movements intact.      Pupils: Pupils are equal, round, and  "reactive to light.   Cardiovascular:      Rate and Rhythm: Normal rate and regular rhythm.   Pulmonary:      Effort: Pulmonary effort is normal.   Abdominal:      Palpations: Abdomen is soft.   Musculoskeletal:      Cervical back: Neck supple.   Skin:     General: Skin is warm.   Neurological:      Mental Status: Miguelina is alert and oriented to person, place, and time.      Motor: Motor function is intact.      Deep Tendon Reflexes:      Reflex Scores:       Tricep reflexes are 2+ on the right side and 2+ on the left side.       Bicep reflexes are 2+ on the right side and 2+ on the left side.       Brachioradialis reflexes are 2+ on the right side and 2+ on the left side.       Patellar reflexes are 2+ on the right side and 2+ on the left side.       Achilles reflexes are 2+ on the right side and 2+ on the left side.  Psychiatric:         Mood and Affect: Mood normal.         Behavior: Behavior normal.         Last Recorded Vitals  Blood pressure 131/90, pulse 82, temperature 36.7 °C (98.1 °F), resp. rate 18, height 1.727 m (5' 8\"), weight (!) 159 kg (350 lb), SpO2 100%.    Reviewed Images  Reviewed and independently interpreted MRI Lumbar spine severe spinal canal or neuroforaminal stenosis and MRI Thoracic spine no significant spinal canal or neuroforaminal narrowing    Reviewed Labs   Latest Reference Range & Units 09/07/24 10:02   WBC 4.4 - 11.3 x10*3/uL 5.9   nRBC 0.0 - 0.0 /100 WBCs 0.0   RBC 4.00 - 5.90 x10*6/uL 5.01   HEMOGLOBIN 12.0 - 17.5 g/dL 11.8 (L)   HEMATOCRIT 36.0 - 52.0 % 39.1   MCV 80 - 100 fL 78 (L)   MCH 26.0 - 34.0 pg 23.6 (L)   MCHC 32.0 - 36.0 g/dL 30.2 (L)   RED CELL DISTRIBUTION WIDTH 11.5 - 14.5 % 13.9   Platelets 150 - 450 x10*3/uL 377   Neutrophils % 40.0 - 80.0 % 57.2   Immature Granulocytes %, Automated 0.0 - 0.9 % 0.3   Lymphocytes % 13.0 - 44.0 % 31.0   Monocytes % 2.0 - 10.0 % 9.3   Eosinophils % 0.0 - 6.0 % 1.7   Basophils % 0.0 - 2.0 % 0.5   Neutrophils Absolute 1.20 - 7.70 " x10*3/uL 3.39   Immature Granulocytes Absolute, Automated 0.00 - 0.70 x10*3/uL 0.02   Lymphocytes Absolute 1.20 - 4.80 x10*3/uL 1.84   Monocytes Absolute 0.10 - 1.00 x10*3/uL 0.55   Eosinophils Absolute 0.00 - 0.70 x10*3/uL 0.10   Basophils Absolute 0.00 - 0.10 x10*3/uL 0.03   (L): Data is abnormally low      Latest Reference Range & Units 09/07/24 10:02   GLUCOSE 74 - 99 mg/dL 95   SODIUM 136 - 145 mmol/L 138   POTASSIUM 3.5 - 5.3 mmol/L 4.5   CHLORIDE 98 - 107 mmol/L 101   Bicarbonate 21 - 32 mmol/L 28   Anion Gap 10 - 20 mmol/L 14   Blood Urea Nitrogen 6 - 23 mg/dL 9   Creatinine 0.50 - 1.30 mg/dL 0.79   EGFR >60 mL/min/1.73m*2 >90   Calcium 8.6 - 10.6 mg/dL 8.6     Assessment/Plan   Encounter Diagnoses   Name Primary?    Disturbance of skin sensation     Cervical radiculopathy     Lumbosacral radiculopathy         Miguelina Gloria is a 39 y.o. adult here for evaluation of widespread pain especially affecting her neck area, bilateral scapular area, mid back and low back pain.  She has been experiencing the symptoms for years has been worse since 2023 after she had a fall.  Since then she has done 16 session of physical therapy which made her pain worse.  Currently managing her pain with combination of Cymbalta, amitriptyline and Tylenol.  She does report modest relief of pain with this regimen.  Reviewed the MRI of her lumbar spine and thoracic spine which did not reveal any severe spinal canal or neural salcedo narrowing.  The EMG does suggest presence of C8-T1 radiculopathy with evidence of active motor axon loss.  Given the EMG findings and lack of response to conservative treatment I would recommend getting an MRI of her cervical spine for further evaluation.  Trial her on Celebrex in the morning and gabapentin at bedtime for symptomatic relief.  Rest of the management after reviewing imaging.  Also counseled her about importance of activity modification, weight loss asked her to discuss options with her primary  care provider.         Sandeep Garcia MD

## 2025-02-22 ENCOUNTER — HOSPITAL ENCOUNTER (OUTPATIENT)
Dept: RADIOLOGY | Facility: HOSPITAL | Age: 40
Discharge: HOME | End: 2025-02-22
Payer: COMMERCIAL

## 2025-02-22 DIAGNOSIS — M54.12 CERVICAL RADICULOPATHY: ICD-10-CM

## 2025-02-22 PROCEDURE — 72141 MRI NECK SPINE W/O DYE: CPT

## 2025-02-22 PROCEDURE — 72141 MRI NECK SPINE W/O DYE: CPT | Performed by: STUDENT IN AN ORGANIZED HEALTH CARE EDUCATION/TRAINING PROGRAM

## 2025-02-27 ENCOUNTER — APPOINTMENT (OUTPATIENT)
Dept: NEUROLOGY | Facility: CLINIC | Age: 40
End: 2025-02-27
Payer: COMMERCIAL

## 2025-02-28 ENCOUNTER — SPECIALTY PHARMACY (OUTPATIENT)
Dept: PHARMACY | Facility: CLINIC | Age: 40
End: 2025-02-28

## 2025-03-05 ENCOUNTER — TELEPHONE (OUTPATIENT)
Dept: PAIN MEDICINE | Facility: CLINIC | Age: 40
End: 2025-03-05
Payer: COMMERCIAL

## 2025-03-05 NOTE — TELEPHONE ENCOUNTER
Patient schduled for 3/13  ----- Message from Sandeep Garcia sent at 3/5/2025  8:00 AM EST -----  Reviewed MRI of her cervical spine there does appear to be some impingement of spinal cord we will discuss during her upcoming follow-up.  ----- Message -----  From: Interface, Radiology Results In  Sent: 2/22/2025   4:40 PM EST  To: Sandeep Garcia MD

## 2025-03-13 ENCOUNTER — OFFICE VISIT (OUTPATIENT)
Dept: PAIN MEDICINE | Facility: CLINIC | Age: 40
End: 2025-03-13
Payer: COMMERCIAL

## 2025-03-13 VITALS
RESPIRATION RATE: 18 BRPM | BODY MASS INDEX: 44.41 KG/M2 | OXYGEN SATURATION: 100 % | HEART RATE: 85 BPM | SYSTOLIC BLOOD PRESSURE: 129 MMHG | HEIGHT: 68 IN | DIASTOLIC BLOOD PRESSURE: 91 MMHG | WEIGHT: 293 LBS | TEMPERATURE: 99 F

## 2025-03-13 DIAGNOSIS — M79.7 FIBROMYALGIA: ICD-10-CM

## 2025-03-13 DIAGNOSIS — M48.02 CERVICAL STENOSIS OF SPINE: Primary | ICD-10-CM

## 2025-03-13 PROCEDURE — 99214 OFFICE O/P EST MOD 30 MIN: CPT | Performed by: ANESTHESIOLOGY

## 2025-03-13 PROCEDURE — 3008F BODY MASS INDEX DOCD: CPT | Performed by: ANESTHESIOLOGY

## 2025-03-13 PROCEDURE — 1036F TOBACCO NON-USER: CPT | Performed by: ANESTHESIOLOGY

## 2025-03-13 RX ORDER — DICLOFENAC SODIUM 50 MG/1
50 TABLET, DELAYED RELEASE ORAL 2 TIMES DAILY PRN
Qty: 40 TABLET | Refills: 0 | Status: SHIPPED | OUTPATIENT
Start: 2025-03-13 | End: 2025-04-12

## 2025-03-13 ASSESSMENT — PATIENT HEALTH QUESTIONNAIRE - PHQ9
SUM OF ALL RESPONSES TO PHQ9 QUESTIONS 1 AND 2: 0
1. LITTLE INTEREST OR PLEASURE IN DOING THINGS: NOT AT ALL
2. FEELING DOWN, DEPRESSED OR HOPELESS: NOT AT ALL

## 2025-03-13 ASSESSMENT — PAIN DESCRIPTION - DESCRIPTORS: DESCRIPTORS: ACHING

## 2025-03-13 ASSESSMENT — PAIN SCALES - GENERAL
PAINLEVEL_OUTOF10: 4
PAINLEVEL_OUTOF10: 4

## 2025-03-13 ASSESSMENT — COLUMBIA-SUICIDE SEVERITY RATING SCALE - C-SSRS
6. HAVE YOU EVER DONE ANYTHING, STARTED TO DO ANYTHING, OR PREPARED TO DO ANYTHING TO END YOUR LIFE?: NO
2. HAVE YOU ACTUALLY HAD ANY THOUGHTS OF KILLING YOURSELF?: NO
1. IN THE PAST MONTH, HAVE YOU WISHED YOU WERE DEAD OR WISHED YOU COULD GO TO SLEEP AND NOT WAKE UP?: NO

## 2025-03-13 ASSESSMENT — LIFESTYLE VARIABLES
HOW OFTEN DO YOU HAVE SIX OR MORE DRINKS ON ONE OCCASION: NEVER
HOW MANY STANDARD DRINKS CONTAINING ALCOHOL DO YOU HAVE ON A TYPICAL DAY: PATIENT DOES NOT DRINK
SKIP TO QUESTIONS 9-10: 1
AUDIT-C TOTAL SCORE: 0
HOW OFTEN DO YOU HAVE A DRINK CONTAINING ALCOHOL: NEVER

## 2025-03-13 ASSESSMENT — ENCOUNTER SYMPTOMS
NECK PAIN: 1
BACK PAIN: 1
DEPRESSION: 1
SHORTNESS OF BREATH: 0
LOSS OF SENSATION IN FEET: 1
BLOOD IN STOOL: 0
OCCASIONAL FEELINGS OF UNSTEADINESS: 1

## 2025-03-13 ASSESSMENT — PAIN - FUNCTIONAL ASSESSMENT: PAIN_FUNCTIONAL_ASSESSMENT: 0-10

## 2025-03-14 PROCEDURE — RXMED WILLOW AMBULATORY MEDICATION CHARGE

## 2025-03-24 ENCOUNTER — PHARMACY VISIT (OUTPATIENT)
Dept: PHARMACY | Facility: CLINIC | Age: 40
End: 2025-03-24
Payer: MEDICARE

## 2025-04-03 ENCOUNTER — OFFICE VISIT (OUTPATIENT)
Dept: NEUROLOGY | Facility: HOSPITAL | Age: 40
End: 2025-04-03
Payer: COMMERCIAL

## 2025-04-03 VITALS
HEIGHT: 68 IN | BODY MASS INDEX: 53.22 KG/M2 | SYSTOLIC BLOOD PRESSURE: 137 MMHG | HEART RATE: 90 BPM | DIASTOLIC BLOOD PRESSURE: 84 MMHG

## 2025-04-03 DIAGNOSIS — R55 POSTURAL DIZZINESS WITH PRESYNCOPE: ICD-10-CM

## 2025-04-03 DIAGNOSIS — E67.8 EXCESSIVE VITAMIN B6 INTAKE: ICD-10-CM

## 2025-04-03 DIAGNOSIS — R42 POSTURAL DIZZINESS WITH PRESYNCOPE: ICD-10-CM

## 2025-04-03 DIAGNOSIS — R20.9 SENSORY DISTURBANCE: Primary | ICD-10-CM

## 2025-04-03 PROCEDURE — 99214 OFFICE O/P EST MOD 30 MIN: CPT | Performed by: PSYCHIATRY & NEUROLOGY

## 2025-04-03 ASSESSMENT — PAIN SCALES - GENERAL: PAINLEVEL_OUTOF10: 5

## 2025-04-03 NOTE — PROGRESS NOTES
[facility] AUTONOMIC PROGRAM    AUTONOMIC FOLLOW-UP VISIT      Milagros Qiu MD  Professor of Neurology  [facility]  Senior Attending Physician- [facility]  Director, Autonomic Program  Medical Director, [facility] [place] [facility] [address]  Office: [phone]  Assistant: Aspen Castanon (email: [email or web address])     Patient Information     Medical Record Number: [id number]   YOB: 1985    Home Address: [facility]  [address]    [address] [place] [address]   Phone Number:  [phone]      Primary Care Physician: Cherelle Rico MD    Referring Physician: No referring provider defined for this encounter.    Patient accompanied by:   In addition to attending physician, patient seen by: Zahira Gaytan MD Neuromuscular Fellow    Clinical Scores    CLINICAL SCORES    OFAS: 50% : You are trying to have a normal life, but you still have more bad days than good days.    Compass 31 (for research purposes only)  Previous Compass 31: (date 8/15/2024): 64/100    IMPRESSION:  Miguelina Gloria is a 39 y.o. y/o right handed nonbinary patient who has been experiencing a constellation of symptoms including migraine headaches, stomach pain, heart palpitation, heat intolerance, excessive sweating, dry eyes/mouth, dim and tunneling of their vision for 20 years that has progressively worsened since 2020/2021.  They also experience numbness and tingling of hands and intermittent orthostatic lightheadedness and have had several syncopal and near syncopal episodes.  They have a strong family history of autoimmune diseases.     Since last visit, their symptoms are overall unchanged. They have stopped taking vitamin B6 supplementation. They feel propranolol 20mg TID is helping. Today's exam revealed normal strength and normal sensation (better than last time). Examination of additional C5/6 innervated  muscle groups (infraspinatus, supraspinatus, rhomboids) was limited due to shoulder discomfort. Reflexes are easy to get with negative Egan and Pectoralis reflexes bilaterally. Toes downgoing.They ambulates with a rollator.     So far we have not uncovered any underlying etiology of their autonomic symptoms. There is no evidence of POTS or small fiber neuropathy on the most recent autonomic testing. Lab work showed some elevated inflammatory markers such as ESR, CRP, early Sjogren's panel, and polyclonal protein, which may point to an underlying inflammatory process. Their EMG showed evidence of cervical and lumbosacral radiculopathy with active denervation and motor unit changes in infraspinatus. It may be helpful to repeat an EMG in a few months to look for any changes, as involvement of additional muscles, if any, could give a clue to the underlying process. The needle EMG changes seen of the infraspinatus may be related to her cervical spine disc budge in C4/5 and represent reinnervation process.    At this time, we plan to repeat vitamin B6 level (fasting) and EMG (left side, peripheral neuropathy protocol) in June 2025. Follow up after that.    Status of the 5 conservatives:  - water: 120 oz/day and sometimes it is hard to remember if they get busy at work  - salt: have been eating a little more salt but not sure how much. Eat pickles and olives  - EOB: unable to elevate due to not sleeping alone  - compression stocking: has not found good compression stocking due to stores only open at the time that they are working. They have not been able to find anything that would fit their size online  - exercise: doing as much as they can    Status of the symptoms:  Overall unchanged though did not feel numb in hands and feet on today's exam.    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   - Autonomic testing showed presence of a symptomatic accentuated orthostatic tachycardia, not sufficient for the diagnosis of Postural Orthostatic Tachycardia Syndrome (POTS). No evidence of cardiovagal or cardiovascular adrenergic abnormality, orthostatic hypotension or small fiber neuropathy.   -Their blood work and urine studies were remarkable for below, mainly elevated vitamin B6 and some inflammatory markers  - Lip biopsy showed nonspecific chronic sialadentitis, does not entirely exclude but not suggestive of Sjogren syndrome.   - Recent MRI cervical spine ordered by pain management provider showed, per radiology, multilevel spondylotic changes of the cervical spine, most pronounced at C4-C5 where there is moderate cord compression due to large central disc protrusion.     PLAN/RECOMMENDATIONS:   - follow up with pain management provider; consider discussing injection to low back as well as neck to ease pain  - see neurosurgeon for evaluation of cervical disc budge and possible cord involvement  - continue conservative measures as much as possible for symptomatic accentuated orthostatic tachycardia  - continue propranolol (prescribed by PCP)  - repeat fasting vitamin b6 level in June 2025  - repeat EMG for peripheral neuropathy (left) in June 2025   - patient to schedule follow up in 3-4 months    HPI    Miguelina Gloria is a 39 y.o. y/o right-handed nonbinary patient presenting to the  Autonomic Program for a follow-up on autonomic work-up. This is follow-up visit 1.      Interval History details   She has switched to a different type of vitamin B supplement which does not have vitamin  B6 in it. This is the third month.   She went to pain management who started her on gabapentin and celecobix which did not help. She had neck MRI which showed a disc bulge with cord compression, so she was referred to neurosurgery. If she is not a surgical candidate then she can get injection. She is scheduled to see neurosurgery April 8. Now she is on diclofenac tablet which didn't seem to help much.     From autonomic perspective, she has not had much change. She is taking propranolol 20mg TID and finds it helpful.    Initial History  They have been experiencing a constellation of symptoms including migraine headaches, stomach pain, heart palpitation, heat intolerance, excessive sweating, dry eyes/mouth, dim and tunneling of vision for 20 years that has progressively worsened since 2020/2021.  They also experience numbness and tingling of hands and intermittent orthostatic lightheadedness and have had several syncopal and near syncopal episodes. They have a strong family history of autoimmune diseases.     They had tilt table test in spring 2022, which showed accentuated orthostatic tachycardia and was concerning for POTS; there was also evidence of postganglionic small fiber neuropathy.  They have been doing nonpharmacologic interventions as much as they can and taking propranolol 20 mg 3 times daily which help with her symptoms somewhat.     They were first seen by Dr. Contreras 8/2024 at which time work up included EMG, autonomic study and blood/urine studies were done. They were also referred to ENT for lip biopsy to rule out Sjogren's.     Labs notable for:  Component  Ref Range & Units 5 mo ago 6 mo ago   Vitamin B6  20.0 - 125.0 nmol/L 204.2 High  154.5 High  CM     Component  Ref Range & Units 6 mo ago   Ig [place] Free Light Chain  0.33 - 1.94 mg/dL 3.39 High    Ig Lambda Free Light Chain  0.57 - 2.63 mg/dL 3.26 High    Kappa/Lambda Ratio  0.26 - 1.65 1.04     Component  Ref Range & Units 6 mo ago    Albumin  3.4 - 5.0 g/dL 3.4   Alpha 1 Globulin  0.2 - 0.6 g/dL 0.3   Alpha 2 Globulin  0.4 - 1.1 g/dL 0.7   Beta Globulin  0.5 - 1.2 g/dL 1.1   Gamma  0.5 - 1.4 g/dL 1.6 High    Protein Electrophoresis Comment Increase in polyclonal gamma globulins.      Immunofixation Comment No monoclonal protein detected by immunofixation.   Path Review - Serum Protein Electrophoresis Reviewed and approved by PALOMA CHAVEZ on 9/11/24 at 10:50 PM.     Path Review - Serum Immunofixation Reviewed and approved by PALOMA CHAVEZ on 9/11/24 at 10:50 PM.     Early Sjogren's  SP-1 IgM Ab 35.9 (positive > 25)  PSP IgG Ab 25.5 (borderline 20-25)  PSP IgM Ab 43.2 (positive > 25)    Aldolase  1.2 - 7.6 U/L 8.1 High      Mycoplasma pneumo IgG  <=0.09 U/L 0.21 High      EMG 1/2025 by Dr. Contreras:   Extensive electrodiagnostic examination of the left lower extremity with a more limited examination of the left upper extremity and low lumbar paraspinals is abnormal. Specifically, the study reveals evidence of  1. Active and chronic neurogenic changes in muscles innervated by the C8/T1 segment/root, consistent with a chronic cervical motor radiculopathy at this level. There is evidence of active motor axon loss.  2. Active and chronic neurogenic changes in muscles innervated by the L5/S1 segment/root, consistent with a chronic lumbosacral motor radiculopathy at this level. There is evidence of active motor axon loss.  3. 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     There is no evidence of an underlying peripheral neuropathy of the large-fiber type.    Autonomic testing 1/2025: presence of a symptomatic accentuated orthostatic tachycardia, not sufficient for the diagnosis of Postural Orthostatic Tachycardia Syndrome (POTS), and there is no evidence of a postganglionic sympathetic  sudomotor abnormality like that seen in autonomic/small fiber neuropathy.     Lip biopsy 12/2024:  A. LOWER LIP, SALIVARY GLAND BIOPSY:  -- Non-specific chronic sialadenitis, see note  -- Focus score = <1, see note.     Note: Sections show adequate sample for pathology scoring comprised of (10 mm² ) acinar surface area. Sections are examined for any significant (ie. > 50 grouped) lymphocyte predominant foci adjacent to normal-appearing mucinous acini. There is one foci present, resulting in a focus score of less than 1. Presence of non-specific chronic sialadenitis and a Focus score of <1 does not entirely exclude the diagnosis of Sjogren syndrome. Correlation with additional clinical and serologic findings is advised.           I spent 40 minutes with the patient, at least 50% of which were spent on treatment management and education.    Milagros Qiu MD

## 2025-04-07 ENCOUNTER — TELEPHONE (OUTPATIENT)
Dept: NEUROSURGERY | Facility: HOSPITAL | Age: 40
End: 2025-04-07

## 2025-04-07 DIAGNOSIS — K59.09 OTHER CONSTIPATION: ICD-10-CM

## 2025-04-07 RX ORDER — LINACLOTIDE 145 UG/1
145 CAPSULE, GELATIN COATED ORAL DAILY
Qty: 90 CAPSULE | Refills: 2 | Status: SHIPPED | OUTPATIENT
Start: 2025-04-07

## 2025-04-08 ENCOUNTER — OFFICE VISIT (OUTPATIENT)
Dept: NEUROSURGERY | Facility: CLINIC | Age: 40
End: 2025-04-08
Payer: COMMERCIAL

## 2025-04-08 VITALS
WEIGHT: 293 LBS | HEIGHT: 68 IN | HEART RATE: 98 BPM | BODY MASS INDEX: 44.41 KG/M2 | TEMPERATURE: 96.4 F | SYSTOLIC BLOOD PRESSURE: 154 MMHG | DIASTOLIC BLOOD PRESSURE: 94 MMHG

## 2025-04-08 DIAGNOSIS — M54.12 CERVICAL RADICULOPATHY: Primary | ICD-10-CM

## 2025-04-08 DIAGNOSIS — M48.02 CERVICAL STENOSIS OF SPINE: ICD-10-CM

## 2025-04-08 PROCEDURE — 3008F BODY MASS INDEX DOCD: CPT | Performed by: NEUROLOGICAL SURGERY

## 2025-04-08 PROCEDURE — 99204 OFFICE O/P NEW MOD 45 MIN: CPT | Performed by: NEUROLOGICAL SURGERY

## 2025-04-08 PROCEDURE — 99214 OFFICE O/P EST MOD 30 MIN: CPT | Performed by: NEUROLOGICAL SURGERY

## 2025-04-08 PROCEDURE — 1036F TOBACCO NON-USER: CPT | Performed by: NEUROLOGICAL SURGERY

## 2025-04-08 ASSESSMENT — PAIN SCALES - GENERAL: PAINLEVEL_OUTOF10: 0-NO PAIN

## 2025-04-08 NOTE — PROGRESS NOTES
It was a pleasure to see   Gloria at the [facility] at [facility].     Miguelina is a really nice 39 y.o. adult  who presents to us with complaints NECK pain RADIATING into the LEFT arm  and BOTH SHOULDERS that started about  2  YEARS  ago, and have been gradually worsening since that time.  The symptoms started after a fall 11/2023    The pain is 8 /10. The pain is described as burning, pinching, sharp, stabbing, stiffness, throbbing, and tingling and occurs intermittently.  Symptoms are exacerbated by nothing in particular. Factors which relieve the pain include change in body position, heat, and ice and tylenol     Numbness and/or tingling - YES both hand, arms, and shoulders    Weakness : YES both arms    Bladder/Bowel symptoms - NO    The patient has tried medications (eg: gabapentin, NSAIDS and narcotics ) : Yes tylenol  PT : No  Pain Management with ESIs/selective nerve blocks  - YES    Miguelina is a SMOKER, DIABETIC, and NON-DIABETIC    History of Depression : YES    PRIOR SPINE SURGERY: NO    Denies use of Aspirin, Coumadin, or Plavix or any other anticoagulants     NARCOTICS for pain : NO    Part of this patient’s history is from personal review of the patient’s previous charts.      Past Medical History:   Diagnosis Date    ADHD (attention deficit hyperactivity disorder) 2022    Anxiety 2023    Cluster headache 2018    Depression 2023    Difficulty walking 2018    Dysphagia 2020    Fibromyalgia, primary 2023    Headache, tension-type 2003    HL (hearing loss) 2021    Impacted cerumen, bilateral 10/26/2020    Bilateral impacted cerumen    Migraine, unspecified, not intractable, without status migrainosus     Migraines    Otalgia, bilateral 11/02/2020    Acute otalgia, bilateral    Other allergy status, other than to drugs and biological substances     History of environmental allergies    Other conditions influencing health status     Intentional weight loss    Personal history of  diseases of the blood and blood-forming organs and certain disorders involving the immune mechanism     History of anemia    Personal history of other diseases of the digestive system 11/20/2020    History of gastroesophageal reflux (GERD)    Personal history of other diseases of the female genital tract 05/13/2019    History of vaginal discharge    Personal history of other diseases of the nervous system and sense organs     History of ear infections    Weakness of limb 2021           Current Outpatient Medications:     amitriptyline (Elavil) 10 mg tablet, TAKE 1 TABLET BY MOUTH EVERYDAY AT BEDTIME, Disp: 90 tablet, Rfl: 2    azelastine (Astelin) 137 mcg (0.1 %) nasal spray, Administer 1 spray into each nostril 2 times a day. Use in each nostril as directed, Disp: 30 mL, Rfl: 11    calcium carbonate-vitamin D3 600 mg-20 mcg (800 unit) tablet, Take by mouth., Disp: , Rfl:     celecoxib (CeleBREX) 200 mg capsule, Take 1 capsule (200 mg) by mouth once daily., Disp: 30 capsule, Rfl: 0    diclofenac (Voltaren) 50 mg EC tablet, Take 1 tablet (50 mg) by mouth 2 times a day as needed (neck pain). Do not crush, chew, or split., Disp: 40 tablet, Rfl: 0    DULoxetine (Cymbalta) 30 mg DR capsule, Do not crush or chew.  Take 30 mg for 1 week before stopping your Cymbalta 7 days before your autonomic testing. Your testing is scheduled for January 28. Start the 30 mg on January 14 and stop the medication January 21. Restart your Cymbalta after testing to 30 mg for one week and then back to your 60 mg dose., Disp: 14 capsule, Rfl: 0    DULoxetine (Cymbalta) 60 mg DR capsule, TAKE 1 CAPSULE BY MOUTH ONCE DAILY., Disp: 90 capsule, Rfl: 3    famotidine (Pepcid) 20 mg tablet, Take 1 tablet (20 mg) by mouth 2 times a day as needed., Disp: , Rfl:     ferrous fumarate/ascorbic acid (FERROUS FUMARATE-VITAMIN C ORAL), Take 1 tablet by mouth once daily., Disp: , Rfl:     ferrous sulfate 325 (65 Fe) MG tablet, Take 1 tablet (325 mg) by mouth  2 times a day., Disp: , Rfl:     fexofenadine-pseudoephedrine (Allegra-D 24 Hour) 180-240 mg 24 hr tablet, Take 1 tablet by mouth once daily., Disp: , Rfl:     FIBER, CALCIUM POLYCARBOPHIL, ORAL, Take 5 g by mouth once daily., Disp: , Rfl:     gabapentin (Neurontin) 300 mg capsule, Take 1-2 capsules (300-600 mg) by mouth once daily at bedtime., Disp: 90 capsule, Rfl: 1    galcanezumab (Emgality Pen) 120 mg/mL auto-injector, INJECT 1 PEN UNDER THE SKIN 1 TIME A MONTH, Disp: 3 each, Rfl: 3    ibuprofen 600 mg tablet, Take by mouth 3 times a day as needed., Disp: , Rfl:     L.acid,ferm,nichelle,rha-B.bif,long (Controlled Delivery Probiotic) 126 mg (2 billion cell) tablet,delayed and ext.release, Take 1 tablet by mouth once daily., Disp: , Rfl:     lactobacillus acidophilus capsule, Take 1 capsule by mouth once daily. Duplicate. Patient only takes 1 Probiotic, Disp: , Rfl:     Linzess 145 mcg capsule, TAKE 1 CAPSULE BY MOUTH EVERY DAY, Disp: 90 capsule, Rfl: 2    magnesium 250 mg tablet, Magnesium 250 MG Oral Tablet Refills: 0  Start: 19-Apr-2023, Disp: , Rfl:     multivit-minerals/folic acid (CENTRUM ADULTS ORAL), Take 1 tablet by mouth once daily., Disp: , Rfl:     multivit-mins no.11-folic acid (Dialyvite 5000) 5 mg tablet, Take by mouth once daily. duplicate, Disp: , Rfl:     propranolol (Inderal) 20 mg tablet, Take 1 tablet (20 mg) by mouth 3 times a day., Disp: 90 tablet, Rfl: 5    rimegepant (NURTEC) 75 mg tablet,disintegrating, Allow one (1) tablet to dissolve on or under the tongue once daily as needed for migraine. Do not take more than one (1) tablet in a 24 hour period., Disp: 16 tablet, Rfl: 5    triamcinolone (Kenalog) 0.1 % ointment, Apply 1 Application topically 2 times a day., Disp: , Rfl:     triamcinolone (Nasacort) 55 mcg nasal inhaler, Nasal Allergy 24 Hour 55 MCG/ACT Nasal Aerosol Refills: 0, Disp: , Rfl:       Review of Systems :   Constitutional: No fever or chills  Respiratory: No shortness of breath  or wheezing  Musculoskeletal: see HPI above   Neurologic: See HPI above    EXAM:   There were no vitals filed for this visit.  NEURO: Neurologically patient is awake alert and oriented X 3    No obvious cranial nerve deficit.  Strength is almost 5/5 throughout all motor groups tested with no asymmetric significant motor deficit.  She has very minimal handgrip weakness which is about 80 to 90% of normal.  Deep tendon reflexes are symmetric throughout.   Gait : WNL   Sensory examination is intact to touch and painful stimuli.      IMAGING:   MRI of the cervical spine that was done on 2/22/2025 was reviewed personally and shows Multilevel spondylotic changes of the cervical spine, most pronounced  at C4-C5 where there is moderate cord compression due to large  central disc protrusion. There is suggestion for increased  intramedullary cord signal on sagittal STIR images that would likely  correlate with myelomalacia       ASSESSMENT AND PLAN:    Juani is a really nice 39 y.o. adult who presents to me with almost a year and a half symptoms of neck pain and bilateral upper extremity pain.  She also has mild unsteady gait and has episodes of near falls a number of times.  Her major symptom remains neck pain and pain involving the upper and mid back region  Juani has mild cervical myelopathy based on mJOA scale (15-17) with no focal or dense neurological deficit.    I discussed the natural history of degenerative cervical stenosis with spinal cord compression in patients with mild cervical myelopathy with the fact that the patient is at risk of  gradual worsening of Miguelina's symptoms from persistent spinal cord compression and the potential for them to be irreversible and discussed the option of surgery with the goal being to prevent further worsening of signs and symptoms of cervical myelopathy from spinal cord compression and also to improve the overall HRQOL.  Briefly discussed with the patient the various surgical  approaches and details of surgery.  Clearly discussed with her that surgery may not not alleviate her neck pain which seems to be her major symptom at this point which is bothering her the most.    While the progression of degenerative cervical stenosis is generally gradual with slow deterioration in function or development of symptoms in majority of the patients, Miguelina was educated and advised against activities predisposing to falls/severe trauma/MVA that may result in acute spinal cord injury/paralysis and resultant deficits and instructed to watch for already existing symptoms of cervical myelopathy if Miguelina  would wish to choose conservative treatment option which is an option at this point of time considering the fact that Miguelina has not demonstrated an acute neurological decline and after understanding the pros and cons of surgery versus continued non-operative treatment.      Did discuss with her that she has morbid obesity and any surgery in her may be associated with elevated risk of perioperative complications    She can try pain management given the fact that pain remains her major symptom at this point of time especially given the fact that she has absence of any focal neurological deficit especially if she understand everything that we discussed today regarding the role of surgery if at all performed.     Ms Gloria clearly understands that in case she develops any new neurological symptoms such as numbness, tingling or weakness involving any of the extremities and any other signs of cervical myelopathy or wants to proceed with surgical treatment that I discussed with her today, she would call our office and schedule an appointment.      Tito Cruz MD, [facility]   of Neurological Surgery  [employer] [facility] [employer]  Attending Surgeon  Director - Minimally Invasive Spine Surgery  [place], [place]      Some of this note was  completed using Dragon voice recognition technology and sometimes the software misinterprets words. This may include unintended errors with respect to translation of words, typographical errors or grammar errors which may not have been identified prior to finalization of the chart note. Please take this into account when reading this note

## 2025-04-09 ENCOUNTER — TELEPHONE (OUTPATIENT)
Dept: PAIN MEDICINE | Facility: CLINIC | Age: 40
End: 2025-04-09
Payer: COMMERCIAL

## 2025-04-10 DIAGNOSIS — M48.02 CERVICAL STENOSIS OF SPINE: ICD-10-CM

## 2025-04-10 RX ORDER — DICLOFENAC SODIUM 50 MG/1
50 TABLET, DELAYED RELEASE ORAL 2 TIMES DAILY PRN
Qty: 40 TABLET | Refills: 0 | Status: SHIPPED | OUTPATIENT
Start: 2025-04-10 | End: 2025-05-10

## 2025-04-22 ENCOUNTER — SPECIALTY PHARMACY (OUTPATIENT)
Dept: PHARMACY | Facility: CLINIC | Age: 40
End: 2025-04-22

## 2025-04-23 ENCOUNTER — SPECIALTY PHARMACY (OUTPATIENT)
Dept: PHARMACY | Facility: CLINIC | Age: 40
End: 2025-04-23

## 2025-05-01 DIAGNOSIS — M48.02 CERVICAL STENOSIS OF SPINE: ICD-10-CM

## 2025-05-02 RX ORDER — DICLOFENAC SODIUM 50 MG/1
50 TABLET, DELAYED RELEASE ORAL 2 TIMES DAILY PRN
Qty: 40 TABLET | Refills: 0 | OUTPATIENT
Start: 2025-05-02 | End: 2025-06-01

## 2025-05-02 RX ORDER — DICLOFENAC SODIUM 50 MG/1
50 TABLET, DELAYED RELEASE ORAL 2 TIMES DAILY PRN
Qty: 40 TABLET | Refills: 0 | Status: SHIPPED | OUTPATIENT
Start: 2025-05-02 | End: 2025-06-01

## 2025-05-15 ENCOUNTER — APPOINTMENT (OUTPATIENT)
Dept: OTOLARYNGOLOGY | Facility: CLINIC | Age: 40
End: 2025-05-15
Payer: COMMERCIAL

## 2025-05-18 ENCOUNTER — APPOINTMENT (OUTPATIENT)
Dept: CARDIOLOGY | Facility: HOSPITAL | Age: 40
End: 2025-05-18
Payer: COMMERCIAL

## 2025-05-18 ENCOUNTER — APPOINTMENT (OUTPATIENT)
Dept: RADIOLOGY | Facility: HOSPITAL | Age: 40
End: 2025-05-18
Payer: COMMERCIAL

## 2025-05-18 ENCOUNTER — HOSPITAL ENCOUNTER (EMERGENCY)
Facility: HOSPITAL | Age: 40
Discharge: HOME | End: 2025-05-18
Attending: STUDENT IN AN ORGANIZED HEALTH CARE EDUCATION/TRAINING PROGRAM
Payer: COMMERCIAL

## 2025-05-18 VITALS
HEIGHT: 68 IN | SYSTOLIC BLOOD PRESSURE: 127 MMHG | WEIGHT: 293 LBS | TEMPERATURE: 96.9 F | HEART RATE: 70 BPM | RESPIRATION RATE: 18 BRPM | DIASTOLIC BLOOD PRESSURE: 82 MMHG | OXYGEN SATURATION: 99 % | BODY MASS INDEX: 44.41 KG/M2

## 2025-05-18 DIAGNOSIS — R07.9 CHEST PAIN, UNSPECIFIED TYPE: Primary | ICD-10-CM

## 2025-05-18 LAB
ALBUMIN SERPL BCP-MCNC: 3.5 G/DL (ref 3.4–5)
ALP SERPL-CCNC: 74 U/L (ref 33–120)
ALT SERPL W P-5'-P-CCNC: 24 U/L (ref 7–52)
ANION GAP SERPL CALC-SCNC: 8 MMOL/L (ref 10–20)
AST SERPL W P-5'-P-CCNC: 29 U/L (ref 9–39)
BASOPHILS # BLD AUTO: 0.02 X10*3/UL (ref 0–0.1)
BASOPHILS NFR BLD AUTO: 0.3 %
BILIRUB SERPL-MCNC: 0.4 MG/DL (ref 0–1.2)
BUN SERPL-MCNC: 8 MG/DL (ref 6–23)
CALCIUM SERPL-MCNC: 8.7 MG/DL (ref 8.6–10.3)
CARDIAC TROPONIN I PNL SERPL HS: 3 NG/L (ref 0–20)
CARDIAC TROPONIN I PNL SERPL HS: 3 NG/L (ref 0–20)
CHLORIDE SERPL-SCNC: 104 MMOL/L (ref 98–107)
CO2 SERPL-SCNC: 28 MMOL/L (ref 21–32)
CREAT SERPL-MCNC: 0.8 MG/DL (ref 0.5–1.3)
EGFRCR SERPLBLD CKD-EPI 2021: >90 ML/MIN/1.73M*2
EOSINOPHIL # BLD AUTO: 0.13 X10*3/UL (ref 0–0.7)
EOSINOPHIL NFR BLD AUTO: 1.9 %
ERYTHROCYTE [DISTWIDTH] IN BLOOD BY AUTOMATED COUNT: 14.3 % (ref 11.5–14.5)
GLUCOSE SERPL-MCNC: 100 MG/DL (ref 74–99)
HCG UR QL IA.RAPID: NEGATIVE
HCT VFR BLD AUTO: 39.4 % (ref 36–52)
HGB BLD-MCNC: 11.9 G/DL (ref 12–17.5)
IMM GRANULOCYTES # BLD AUTO: 0.01 X10*3/UL (ref 0–0.7)
IMM GRANULOCYTES NFR BLD AUTO: 0.1 % (ref 0–0.9)
LYMPHOCYTES # BLD AUTO: 2.11 X10*3/UL (ref 1.2–4.8)
LYMPHOCYTES NFR BLD AUTO: 30.7 %
MAGNESIUM SERPL-MCNC: 1.81 MG/DL (ref 1.6–2.4)
MCH RBC QN AUTO: 23.4 PG (ref 26–34)
MCHC RBC AUTO-ENTMCNC: 30.2 G/DL (ref 32–36)
MCV RBC AUTO: 77 FL (ref 80–100)
MONOCYTES # BLD AUTO: 0.88 X10*3/UL (ref 0.1–1)
MONOCYTES NFR BLD AUTO: 12.8 %
NEUTROPHILS # BLD AUTO: 3.73 X10*3/UL (ref 1.2–7.7)
NEUTROPHILS NFR BLD AUTO: 54.2 %
NRBC BLD-RTO: 0 /100 WBCS (ref 0–0)
PLATELET # BLD AUTO: 325 X10*3/UL (ref 150–450)
POTASSIUM SERPL-SCNC: 4.3 MMOL/L (ref 3.5–5.3)
PROT SERPL-MCNC: 6.8 G/DL (ref 6.4–8.2)
RBC # BLD AUTO: 5.09 X10*6/UL (ref 4–5.9)
SODIUM SERPL-SCNC: 136 MMOL/L (ref 136–145)
WBC # BLD AUTO: 6.9 X10*3/UL (ref 4.4–11.3)

## 2025-05-18 PROCEDURE — 99285 EMERGENCY DEPT VISIT HI MDM: CPT | Mod: 25 | Performed by: STUDENT IN AN ORGANIZED HEALTH CARE EDUCATION/TRAINING PROGRAM

## 2025-05-18 PROCEDURE — 83735 ASSAY OF MAGNESIUM: CPT | Performed by: STUDENT IN AN ORGANIZED HEALTH CARE EDUCATION/TRAINING PROGRAM

## 2025-05-18 PROCEDURE — 84484 ASSAY OF TROPONIN QUANT: CPT | Performed by: STUDENT IN AN ORGANIZED HEALTH CARE EDUCATION/TRAINING PROGRAM

## 2025-05-18 PROCEDURE — 85025 COMPLETE CBC W/AUTO DIFF WBC: CPT | Performed by: STUDENT IN AN ORGANIZED HEALTH CARE EDUCATION/TRAINING PROGRAM

## 2025-05-18 PROCEDURE — 71046 X-RAY EXAM CHEST 2 VIEWS: CPT | Mod: FOREIGN READ | Performed by: RADIOLOGY

## 2025-05-18 PROCEDURE — 93005 ELECTROCARDIOGRAM TRACING: CPT

## 2025-05-18 PROCEDURE — 71046 X-RAY EXAM CHEST 2 VIEWS: CPT

## 2025-05-18 PROCEDURE — 36415 COLL VENOUS BLD VENIPUNCTURE: CPT | Performed by: STUDENT IN AN ORGANIZED HEALTH CARE EDUCATION/TRAINING PROGRAM

## 2025-05-18 PROCEDURE — 81025 URINE PREGNANCY TEST: CPT | Performed by: STUDENT IN AN ORGANIZED HEALTH CARE EDUCATION/TRAINING PROGRAM

## 2025-05-18 PROCEDURE — 80053 COMPREHEN METABOLIC PANEL: CPT | Performed by: STUDENT IN AN ORGANIZED HEALTH CARE EDUCATION/TRAINING PROGRAM

## 2025-05-18 PROCEDURE — 2500000001 HC RX 250 WO HCPCS SELF ADMINISTERED DRUGS (ALT 637 FOR MEDICARE OP): Performed by: STUDENT IN AN ORGANIZED HEALTH CARE EDUCATION/TRAINING PROGRAM

## 2025-05-18 RX ORDER — NAPROXEN SODIUM 220 MG/1
324 TABLET, FILM COATED ORAL ONCE
Status: COMPLETED | OUTPATIENT
Start: 2025-05-18 | End: 2025-05-18

## 2025-05-18 RX ORDER — FAMOTIDINE 20 MG/1
20 TABLET, FILM COATED ORAL ONCE
Status: COMPLETED | OUTPATIENT
Start: 2025-05-18 | End: 2025-05-18

## 2025-05-18 RX ORDER — NITROGLYCERIN 0.4 MG/1
0.4 TABLET SUBLINGUAL EVERY 5 MIN PRN
Status: DISCONTINUED | OUTPATIENT
Start: 2025-05-18 | End: 2025-05-18 | Stop reason: HOSPADM

## 2025-05-18 RX ORDER — ACETAMINOPHEN 325 MG/1
975 TABLET ORAL ONCE
Status: COMPLETED | OUTPATIENT
Start: 2025-05-18 | End: 2025-05-18

## 2025-05-18 RX ADMIN — FAMOTIDINE 20 MG: 20 TABLET, FILM COATED ORAL at 14:41

## 2025-05-18 RX ADMIN — ACETAMINOPHEN 975 MG: 325 TABLET ORAL at 14:41

## 2025-05-18 RX ADMIN — ASPIRIN 81 MG CHEWABLE TABLET 324 MG: 81 TABLET CHEWABLE at 13:00

## 2025-05-18 ASSESSMENT — PAIN DESCRIPTION - DESCRIPTORS: DESCRIPTORS: ACHING

## 2025-05-18 ASSESSMENT — PAIN DESCRIPTION - ORIENTATION: ORIENTATION: RIGHT

## 2025-05-18 ASSESSMENT — PAIN SCALES - GENERAL
PAINLEVEL_OUTOF10: 5 - MODERATE PAIN
PAINLEVEL_OUTOF10: 5 - MODERATE PAIN

## 2025-05-18 ASSESSMENT — PAIN DESCRIPTION - PAIN TYPE: TYPE: ACUTE PAIN

## 2025-05-18 ASSESSMENT — PAIN DESCRIPTION - LOCATION: LOCATION: CHEST

## 2025-05-18 ASSESSMENT — PAIN - FUNCTIONAL ASSESSMENT: PAIN_FUNCTIONAL_ASSESSMENT: 0-10

## 2025-05-18 NOTE — ED PROVIDER NOTES
"    HPI   Chief Complaint   Patient presents with    right sided chest pain, right arm pain       HPI: Patient is a 39-year-old female, she has a past medical history of migraines, fibromyalgia, POTS, cervical radiculopathy, polymyalgia, and morbid obesity, who is presenting to the emergency department for chest pain.  Chest pain she describes as a pressure burning, chest into the throat, associated with some right arm pain and weakness.  Says it started this morning.  No alleviating or aggravating factors.  Did not take anything for her symptoms.  Denies ever having symptoms like this previously.      ROS: Complete 12 point review of systems performed, otherwise negative except as noted in the history of present illness    PMH: Reviewed, documented below in note. Pertinents in HPI  PSH: Reviewed and documented below in note. Pertinents in HPI  SH: No alcohol or illicits.  Not homeless.  Fam: Reviewed, noncontributory to patients current complaint  MEDS: Reviewed and documented below in note. Pertinents in HPI  ALLERGIES: Reviewed and documented below in note.            History provided by:  Patient   used: No                          No data recorded                Patient History   Medical History[1]  Surgical History[2]  Family History[3]  Social History[4]    Physical Exam   Visit Vitals  /82   Pulse 70   Temp 36.1 °C (96.9 °F)   Resp 18   Ht 1.727 m (5' 8\")   Wt (!) 159 kg (350 lb)   SpO2 99%   BMI 53.22 kg/m²   OB Status Having periods   Smoking Status Never   BSA 2.76 m²      Physical Exam  Vitals and nursing note reviewed.   Constitutional:       Appearance: Normal appearance. Miguelina is obese.   HENT:      Head: Normocephalic and atraumatic.   Neck:      Vascular: No carotid bruit.   Cardiovascular:      Rate and Rhythm: Normal rate and regular rhythm.      Pulses: Normal pulses.      Heart sounds: Normal heart sounds.   Pulmonary:      Effort: Pulmonary effort is normal.      " Breath sounds: Normal breath sounds.   Abdominal:      General: There is no distension.      Palpations: Abdomen is soft.      Tenderness: There is no abdominal tenderness. There is no guarding or rebound.   Musculoskeletal:         General: Tenderness present. No deformity or signs of injury.      Cervical back: Normal range of motion. No rigidity.      Comments: Reproducible chest and right shoulder pain on exam.   Skin:     General: Skin is warm and dry.      Capillary Refill: Capillary refill takes less than 2 seconds.   Neurological:      General: No focal deficit present.      Mental Status: Miguelina is alert and oriented to person, place, and time.      Sensory: No sensory deficit.      Motor: No weakness.   Psychiatric:         Mood and Affect: Mood normal.         Behavior: Behavior normal.         XR chest 2 views   Final Result   No acute process.   Signed by Sawyer Emery MD          Labs Reviewed   CBC WITH AUTO DIFFERENTIAL - Abnormal       Result Value    WBC 6.9      nRBC 0.0      RBC 5.09      Hemoglobin 11.9 (*)     Hematocrit 39.4      MCV 77 (*)     MCH 23.4 (*)     MCHC 30.2 (*)     RDW 14.3      Platelets 325      Neutrophils % 54.2      Immature Granulocytes %, Automated 0.1      Lymphocytes % 30.7      Monocytes % 12.8      Eosinophils % 1.9      Basophils % 0.3      Neutrophils Absolute 3.73      Immature Granulocytes Absolute, Automated 0.01      Lymphocytes Absolute 2.11      Monocytes Absolute 0.88      Eosinophils Absolute 0.13      Basophils Absolute 0.02     COMPREHENSIVE METABOLIC PANEL - Abnormal    Glucose 100 (*)     Sodium 136      Potassium 4.3      Chloride 104      Bicarbonate 28      Anion Gap 8 (*)     Urea Nitrogen 8      Creatinine 0.80      eGFR >90      Calcium 8.7      Albumin 3.5      Alkaline Phosphatase 74      Total Protein 6.8      AST 29      Bilirubin, Total 0.4      ALT 24     MAGNESIUM - Normal    Magnesium 1.81     HCG, URINE, QUALITATIVE - Normal    HCG, Urine  NEGATIVE     SERIAL TROPONIN-INITIAL - Normal    Troponin I, High Sensitivity 3      Narrative:     Less than 99th percentile of normal range cutoff-  Female and children under 18 years old <14 ng/L; Male <21 ng/L: Negative  Repeat testing should be performed if clinically indicated.     Female and children under 18 years old 14-50 ng/L; Male 21-50 ng/L:  Consistent with possible cardiac damage and possible increased clinical   risk. Serial measurements may help to assess extent of myocardial damage.     >50 ng/L: Consistent with cardiac damage, increased clinical risk and  myocardial infarction. Serial measurements may help assess extent of   myocardial damage.      NOTE: Children less than 1 year old may have higher baseline troponin   levels and results should be interpreted in conjunction with the overall   clinical context.     NOTE: Troponin I testing is performed using a different   testing methodology at [facility] than at other   [facility]. Direct result comparisons should only   be made within the same method.   SERIAL TROPONIN, 1 HOUR - Normal    Troponin I, High Sensitivity 3      Narrative:     Less than 99th percentile of normal range cutoff-  Female and children under 18 years old <14 ng/L; Male <21 ng/L: Negative  Repeat testing should be performed if clinically indicated.     Female and children under 18 years old 14-50 ng/L; Male 21-50 ng/L:  Consistent with possible cardiac damage and possible increased clinical   risk. Serial measurements may help to assess extent of myocardial damage.     >50 ng/L: Consistent with cardiac damage, increased clinical risk and  myocardial infarction. Serial measurements may help assess extent of   myocardial damage.      NOTE: Children less than 1 year old may have higher baseline troponin   levels and results should be interpreted in conjunction with the overall   clinical context.     NOTE: Troponin I testing is performed using a different    testing methodology at [facility] than at other   [facility]. Direct result comparisons should only   be made within the same method.   TROPONIN SERIES- (INITIAL, 1 HR)    Narrative:     The following orders were created for panel order Troponin I Series, High Sensitivity (0, 1 HR).  Procedure                               Abnormality         Status                     ---------                               -----------         ------                     Troponin I, High Sensiti...[[id number]]  Normal              Final result               Troponin, High Sensitivi...[[id number]]  Normal              Final result                 Please view results for these tests on the individual orders.         ED Course & MDM   ED Course as of 05/18/25 1510   Sun May 18, 2025   1411 EKG of 12 4621 demonstrates sinus rhythm with a ventricular rate of 74, normal axis, short TX interval with variable, no evidence of STEMI.  EKG obtained at 12 4710 demonstrates sinus rhythm with a ventricular rate of 74, normal axis, normal intervals, no evidence of acute STEMI. [NS]      ED Course User Index  [NS] Devonte Nathan MD         Diagnoses as of 05/18/25 1510   Chest pain, unspecified type           Medical Decision Making  All mentioned lab results, ECGs, and imaging were independently reviewed by myself  - Patient evaluated. Patient is presenting to the emergency department for chest pain.  Differential includes but is not limited to potential ACS, Prinzmetal angina, pericarditis, esophagitis or GERD, costochondritis, musculoskeletal pain or exacerbation of her fibromyalgia to name a few.  Patient was hypertensive here in the emergency department initially, was given aspirin Pepcid and Tylenol for symptom control while labs and imaging and EKG were ordered.  EKGs demonstrated no evidence of any acute ischemic pathology and her cardiac enzymes are within normal limits.  The rest of her blood work is  reassuring as well.  On repeat evaluation, blood pressure is improved, she reports improvement of her symptoms as well.  She has a heart score of 2.  I feel she is stable for discharge with close outpatient follow-up to her primary care physician.  All questions were answered.  She was discharged home in a stable condition    - Monitored for any changes in stability or symptomatology. Patient remained stable.   - Counseled regarding labs, imaging, diagnosis, and plan. Patient was agreeable. All questions were answered. The patient was receptive and agreeable to the plan of care.   -The patient was instructed to return to the emergency department if any symptoms recurred, worsened, or if there were any additional concerns.    *Disclaimer: This note was dictated by speech recognition. Minor errors in transcription may be present. Please call with questions.    Keron Nathan MD             Your medication list        ASK your doctor about these medications        Instructions Last Dose Given Next Dose Due   Allegra-D 24 Hour 180-240 mg 24 hr tablet  Generic drug: fexofenadine-pseudoephedrine           amitriptyline 10 mg tablet  Commonly known as: Elavil      TAKE 1 TABLET BY MOUTH EVERYDAY AT BEDTIME       azelastine 137 mcg (0.1 %) nasal spray  Commonly known as: Astelin      Administer 1 spray into each nostril 2 times a day. Use in each nostril as directed       calcium carbonate-vitamin D3 600 mg-20 mcg (800 unit) tablet           celecoxib 200 mg capsule  Commonly known as: CeleBREX      Take 1 capsule (200 mg) by mouth once daily.       CENTRUM ADULTS ORAL           Controlled Delivery Probiotic 126 mg (2 billion cell) tablet,delayed and ext.release  Generic drug: L.acid,ferm,nichelle,rha-B.bif,long           Dialyvite 5000 5 mg tablet  Generic drug: multivit-mins no.11-folic acid           diclofenac 50 mg EC tablet  Commonly known as: Voltaren      Take 1 tablet (50 mg) by mouth 2 times a day as needed (neck  pain). Do not crush, chew, or split.       DULoxetine 30 mg DR capsule  Commonly known as: Cymbalta      Do not crush or chew.    Take 30 mg for 1 week before stopping your Cymbalta 7 days before your autonomic testing. Your testing is scheduled for January 28. Start the 30 mg on January 14 and stop the medication January 21. Restart your Cymbalta after testing to 30 mg for one week and then back to your 60 mg dose.       DULoxetine 60 mg DR capsule  Commonly known as: Cymbalta      TAKE 1 CAPSULE BY MOUTH ONCE DAILY.       Emgality Pen 120 mg/mL auto-injector  Generic drug: galcanezumab      INJECT 1 PEN UNDER THE SKIN 1 TIME A MONTH       famotidine 20 mg tablet  Commonly known as: Pepcid           FERROUS FUMARATE-VITAMIN C ORAL           ferrous sulfate 325 mg (65 mg elemental) tablet           FIBER (CALCIUM POLYCARBOPHIL) ORAL           gabapentin 300 mg capsule  Commonly known as: Neurontin      Take 1-2 capsules (300-600 mg) by mouth once daily at bedtime.       ibuprofen 600 mg tablet           lactobacillus acidophilus capsule           Linzess 145 mcg capsule  Generic drug: linaCLOtide      TAKE 1 CAPSULE BY MOUTH EVERY DAY       magnesium 250 mg tablet           Nurtec ODT 75 mg tablet,disintegrating  Generic drug: rimegepant      Allow one (1) tablet to dissolve on or under the tongue once daily as needed for migraine. Do not take more than one (1) tablet in a 24 hour period.       propranolol 20 mg tablet  Commonly known as: Inderal      Take 1 tablet (20 mg) by mouth 3 times a day.       triamcinolone 55 mcg nasal inhaler  Commonly known as: Nasacort           triamcinolone 0.1 % ointment  Commonly known as: Kenalog                    Procedure  Procedures     *This report was transcribed using voice recognition software.  Every effort was made to ensure accuracy; however, inadvertent computerized transcription errors may be present.*  Devonte Nathan MD  05/18/25           [1]   Past Medical  History:  Diagnosis Date    ADHD (attention deficit hyperactivity disorder) 2022    Anxiety 2023    Cluster headache 2018    Depression 2023    Difficulty walking 2018    Dysphagia 2020    Fibromyalgia, primary 2023    Headache, tension-type 2003    HL (hearing loss) 2021    Impacted cerumen, bilateral 10/26/2020    Bilateral impacted cerumen    Migraine, unspecified, not intractable, without status migrainosus     Migraines    Otalgia, bilateral 11/02/2020    Acute otalgia, bilateral    Other allergy status, other than to drugs and biological substances     History of environmental allergies    Other conditions influencing health status     Intentional weight loss    Personal history of diseases of the blood and blood-forming organs and certain disorders involving the immune mechanism     History of anemia    Personal history of other diseases of the digestive system 11/20/2020    History of gastroesophageal reflux (GERD)    Personal history of other diseases of the female genital tract 05/13/2019    History of vaginal discharge    Personal history of other diseases of the nervous system and sense organs     History of ear infections    Weakness of limb 2021   [2]   Past Surgical History:  Procedure Laterality Date    OTHER SURGICAL HISTORY  12/14/2020    No history of surgery   [3]   Family History  Problem Relation Name Age of Onset    Eczema Mother      Stroke Father Richard     Lung cancer Father Richard     ADD / ADHD Father Richard     Other (environmental allergies) Other      Diabetes Other      Hypertension Other      Stroke Other Uncle     Heart disease Other Aunt         hole in heart    Heart disease Cousin          hole in herat    Dementia Maternal Grandmother Terrell     Stroke Maternal Grandmother Terrell     Dementia Mother's Brother Yang     Fibromyalgia Mother's Brother Yang     Stroke Mother's Brother Yang     Depression Sister Rachelle     Intellectual Disability Sister Rachelle    [4]    Social History  Tobacco Use    Smoking status: Never    Smokeless tobacco: Never   Vaping Use    Vaping status: Never Used   Substance Use Topics    Alcohol use: Never    Drug use: Never        Devonte Nathan MD  05/18/25 6665

## 2025-05-19 ENCOUNTER — TELEPHONE (OUTPATIENT)
Dept: PAIN MEDICINE | Facility: CLINIC | Age: 40
End: 2025-05-19

## 2025-05-19 NOTE — TELEPHONE ENCOUNTER
Called patient and let her know ou NP will be doing a peer to peer in regards to her procedure and our prior auth team will follow up.

## 2025-05-20 ENCOUNTER — PHARMACY VISIT (OUTPATIENT)
Dept: PHARMACY | Facility: CLINIC | Age: 40
End: 2025-05-20
Payer: MEDICARE

## 2025-05-20 LAB
ATRIAL RATE: 74 BPM
P AXIS: 28 DEGREES
PR INTERVAL: 117 MS
Q ONSET: 249 MS
QRS COUNT: 12 BEATS
QRS DURATION: 93 MS
QT INTERVAL: 359 MS
QTC CALCULATION(BAZETT): 399 MS
QTC FREDERICIA: 385 MS
R AXIS: 45 DEGREES
T AXIS: 18 DEGREES
T OFFSET: 429 MS
VENTRICULAR RATE: 74 BPM

## 2025-05-20 PROCEDURE — RXMED WILLOW AMBULATORY MEDICATION CHARGE

## 2025-05-21 ENCOUNTER — PATIENT MESSAGE (OUTPATIENT)
Dept: PAIN MEDICINE | Facility: CLINIC | Age: 40
End: 2025-05-21
Payer: COMMERCIAL

## 2025-05-21 DIAGNOSIS — M54.16 LUMBAR RADICULOPATHY: Primary | ICD-10-CM

## 2025-05-21 RX ORDER — MELOXICAM 15 MG/1
15 TABLET ORAL DAILY
Qty: 30 TABLET | Refills: 0 | Status: SHIPPED | OUTPATIENT
Start: 2025-05-21 | End: 2025-06-20

## 2025-05-22 ENCOUNTER — APPOINTMENT (OUTPATIENT)
Dept: NEUROLOGY | Facility: HOSPITAL | Age: 40
End: 2025-05-22
Payer: COMMERCIAL

## 2025-05-23 ENCOUNTER — TELEPHONE (OUTPATIENT)
Dept: PAIN MEDICINE | Facility: CLINIC | Age: 40
End: 2025-05-23
Payer: COMMERCIAL

## 2025-05-23 ENCOUNTER — APPOINTMENT (OUTPATIENT)
Dept: PAIN MEDICINE | Facility: CLINIC | Age: 40
End: 2025-05-23
Payer: COMMERCIAL

## 2025-05-27 ENCOUNTER — TELEPHONE (OUTPATIENT)
Dept: PAIN MEDICINE | Facility: CLINIC | Age: 40
End: 2025-05-27
Payer: COMMERCIAL

## 2025-05-27 NOTE — TELEPHONE ENCOUNTER
A peer to peer was completed for denial of cervical spine KELY.  I had an in-depth discussion with Dr. Camops, after further review of the information and providing notes from neurosurgery visit the cervical epidural steroid injection has been approved.  The neurosurgery notes were emailed directly to Dr. Campos for approval.  All questions and concerns answered.  Thank you,  Issac Pelaez CNP

## 2025-05-30 ENCOUNTER — HOSPITAL ENCOUNTER (OUTPATIENT)
Dept: PAIN MEDICINE | Facility: CLINIC | Age: 40
Discharge: HOME | End: 2025-05-30
Payer: COMMERCIAL

## 2025-05-30 VITALS
SYSTOLIC BLOOD PRESSURE: 109 MMHG | HEIGHT: 68 IN | DIASTOLIC BLOOD PRESSURE: 67 MMHG | TEMPERATURE: 97 F | WEIGHT: 293 LBS | RESPIRATION RATE: 15 BRPM | OXYGEN SATURATION: 95 % | BODY MASS INDEX: 44.41 KG/M2 | HEART RATE: 80 BPM

## 2025-05-30 DIAGNOSIS — M54.12 CERVICAL RADICULOPATHY: ICD-10-CM

## 2025-05-30 DIAGNOSIS — M54.9 UPPER BACK PAIN: Primary | ICD-10-CM

## 2025-05-30 LAB — PREGNANCY TEST URINE, POC: NEGATIVE

## 2025-05-30 PROCEDURE — 2550000001 HC RX 255 CONTRASTS: Performed by: ANESTHESIOLOGY

## 2025-05-30 PROCEDURE — 2500000004 HC RX 250 GENERAL PHARMACY W/ HCPCS (ALT 636 FOR OP/ED): Mod: JZ | Performed by: ANESTHESIOLOGY

## 2025-05-30 PROCEDURE — 2500000005 HC RX 250 GENERAL PHARMACY W/O HCPCS: Performed by: ANESTHESIOLOGY

## 2025-05-30 PROCEDURE — 62321 NJX INTERLAMINAR CRV/THRC: CPT | Performed by: ANESTHESIOLOGY

## 2025-05-30 RX ORDER — LIDOCAINE HYDROCHLORIDE 10 MG/ML
INJECTION, SOLUTION EPIDURAL; INFILTRATION; INTRACAUDAL; PERINEURAL AS NEEDED
Status: COMPLETED | OUTPATIENT
Start: 2025-05-30 | End: 2025-05-30

## 2025-05-30 RX ORDER — METHYLPREDNISOLONE ACETATE 40 MG/ML
INJECTION, SUSPENSION INTRA-ARTICULAR; INTRALESIONAL; INTRAMUSCULAR; SOFT TISSUE AS NEEDED
Status: COMPLETED | OUTPATIENT
Start: 2025-05-30 | End: 2025-05-30

## 2025-05-30 RX ORDER — SODIUM CHLORIDE 9 MG/ML
INJECTION, SOLUTION INTRAMUSCULAR; INTRAVENOUS; SUBCUTANEOUS AS NEEDED
Status: COMPLETED | OUTPATIENT
Start: 2025-05-30 | End: 2025-05-30

## 2025-05-30 RX ADMIN — SODIUM CHLORIDE 5 ML: 9 INJECTION, SOLUTION INTRAMUSCULAR; INTRAVENOUS; SUBCUTANEOUS at 09:03

## 2025-05-30 RX ADMIN — IOHEXOL 3 ML: 300 INJECTION, SOLUTION INTRAVENOUS at 09:02

## 2025-05-30 RX ADMIN — LIDOCAINE HYDROCHLORIDE 5 ML: 10 INJECTION, SOLUTION EPIDURAL; INFILTRATION; INTRACAUDAL; PERINEURAL at 09:01

## 2025-05-30 RX ADMIN — METHYLPREDNISOLONE ACETATE 40 MG: 40 INJECTION, SUSPENSION INTRA-ARTICULAR; INTRALESIONAL; INTRAMUSCULAR; INTRASYNOVIAL; SOFT TISSUE at 09:02

## 2025-05-30 ASSESSMENT — PATIENT HEALTH QUESTIONNAIRE - PHQ9
1. LITTLE INTEREST OR PLEASURE IN DOING THINGS: NOT AT ALL
SUM OF ALL RESPONSES TO PHQ9 QUESTIONS 1 AND 2: 1
2. FEELING DOWN, DEPRESSED OR HOPELESS: SEVERAL DAYS

## 2025-05-30 ASSESSMENT — PAIN DESCRIPTION - DESCRIPTORS: DESCRIPTORS: ACHING;SORE

## 2025-05-30 ASSESSMENT — ENCOUNTER SYMPTOMS
SHORTNESS OF BREATH: 0
FEVER: 0

## 2025-05-30 ASSESSMENT — PAIN - FUNCTIONAL ASSESSMENT
PAIN_FUNCTIONAL_ASSESSMENT: 0-10
PAIN_FUNCTIONAL_ASSESSMENT: 0-10

## 2025-05-30 ASSESSMENT — PAIN SCALES - GENERAL
PAINLEVEL_OUTOF10: 4
PAINLEVEL_OUTOF10: 3

## 2025-05-30 ASSESSMENT — COLUMBIA-SUICIDE SEVERITY RATING SCALE - C-SSRS
6. HAVE YOU EVER DONE ANYTHING, STARTED TO DO ANYTHING, OR PREPARED TO DO ANYTHING TO END YOUR LIFE?: NO
1. IN THE PAST MONTH, HAVE YOU WISHED YOU WERE DEAD OR WISHED YOU COULD GO TO SLEEP AND NOT WAKE UP?: NO
2. HAVE YOU ACTUALLY HAD ANY THOUGHTS OF KILLING YOURSELF?: NO

## 2025-05-30 NOTE — H&P
"History Of Present Illness  Miguelina Gloria is a 39 y.o. adult presenting  for FLACO     Past Medical History  Medical History[1]    Surgical History  Surgical History[2]     Social History  Miguelina reports that Miguelina has never smoked. Miguelina has never used smokeless tobacco. Miguelina reports that Miguelina does not drink alcohol and does not use drugs.    Family History  Family History[3]     Allergies  Bee pollen, Grass pollen, Kiwi, Pineapple, and Silver nitrate    Review of Systems   Constitutional:  Negative for fever.   Respiratory:  Negative for shortness of breath.    Cardiovascular:  Negative for chest pain.   Psychiatric/Behavioral:  Negative for suicidal ideas.         Physical Exam  Cardiovascular:      Rate and Rhythm: Normal rate.   Pulmonary:      Effort: Pulmonary effort is normal.   Neurological:      Mental Status: Miguelina is alert and oriented to person, place, and time.          Last Recorded Vitals  Blood pressure 131/71, pulse 82, temperature 36.1 °C (97 °F), temperature source Temporal, resp. rate 18, height 1.727 m (5' 8\"), weight (!) 159 kg (350 lb), SpO2 97%.      Assessment/Plan       Patient does not have any contraindications for the planned procedure.      Sandeep Garcia MD         [1]   Past Medical History:  Diagnosis Date    ADHD (attention deficit hyperactivity disorder) 2022    Anxiety 2023    Cluster headache 2018    Depression 2023    Difficulty walking 2018    Dysphagia 2020    Fibromyalgia, primary 2023    Headache, tension-type 2003    HL (hearing loss) 2021    Impacted cerumen, bilateral 10/26/2020    Bilateral impacted cerumen    Migraine, unspecified, not intractable, without status migrainosus     Migraines    Otalgia, bilateral 11/02/2020    Acute otalgia, bilateral    Other allergy status, other than to drugs and biological substances     History of environmental allergies    Other conditions influencing health status     Intentional weight loss    Personal history of " diseases of the blood and blood-forming organs and certain disorders involving the immune mechanism     History of anemia    Personal history of other diseases of the digestive system 11/20/2020    History of gastroesophageal reflux (GERD)    Personal history of other diseases of the female genital tract 05/13/2019    History of vaginal discharge    Personal history of other diseases of the nervous system and sense organs     History of ear infections    Weakness of limb 2021   [2]   Past Surgical History:  Procedure Laterality Date    OTHER SURGICAL HISTORY  12/14/2020    No history of surgery   [3]   Family History  Problem Relation Name Age of Onset    Eczema Mother      Stroke Father Richard     Lung cancer Father Richard     ADD / ADHD Father Richard     Other (environmental allergies) Other      Diabetes Other      Hypertension Other      Stroke Other Uncle     Heart disease Other Aunt         hole in heart    Heart disease Cousin          hole in herat    Dementia Maternal Grandmother Terrell     Stroke Maternal Grandmother Terrell     Dementia Mother's Brother Yang     Fibromyalgia Mother's Brother Yang     Stroke Mother's Brother Yang     Depression Sister Rachelle     Intellectual Disability Sister Rachelle

## 2025-06-09 ENCOUNTER — ANCILLARY PROCEDURE (OUTPATIENT)
Dept: URGENT CARE | Age: 40
End: 2025-06-09
Payer: COMMERCIAL

## 2025-06-09 ENCOUNTER — OFFICE VISIT (OUTPATIENT)
Dept: URGENT CARE | Age: 40
End: 2025-06-09
Payer: COMMERCIAL

## 2025-06-09 VITALS
TEMPERATURE: 98.2 F | SYSTOLIC BLOOD PRESSURE: 137 MMHG | RESPIRATION RATE: 18 BRPM | HEART RATE: 96 BPM | DIASTOLIC BLOOD PRESSURE: 88 MMHG | OXYGEN SATURATION: 98 %

## 2025-06-09 DIAGNOSIS — M25.552 LEFT HIP PAIN: ICD-10-CM

## 2025-06-09 DIAGNOSIS — M25.511 ACUTE PAIN OF RIGHT SHOULDER: ICD-10-CM

## 2025-06-09 DIAGNOSIS — M25.511 ACUTE PAIN OF RIGHT SHOULDER: Primary | ICD-10-CM

## 2025-06-09 PROCEDURE — 1036F TOBACCO NON-USER: CPT | Performed by: NURSE PRACTITIONER

## 2025-06-09 PROCEDURE — 73030 X-RAY EXAM OF SHOULDER: CPT | Mod: RIGHT SIDE | Performed by: NURSE PRACTITIONER

## 2025-06-09 PROCEDURE — 73502 X-RAY EXAM HIP UNI 2-3 VIEWS: CPT | Mod: LEFT SIDE | Performed by: NURSE PRACTITIONER

## 2025-06-09 PROCEDURE — 99214 OFFICE O/P EST MOD 30 MIN: CPT | Performed by: NURSE PRACTITIONER

## 2025-06-09 RX ORDER — METHYLPREDNISOLONE 4 MG/1
TABLET ORAL
Qty: 21 TABLET | Refills: 0 | Status: SHIPPED | OUTPATIENT
Start: 2025-06-09

## 2025-06-09 RX ORDER — CYCLOBENZAPRINE HCL 10 MG
10 TABLET ORAL 3 TIMES DAILY
Qty: 21 TABLET | Refills: 0 | Status: SHIPPED | OUTPATIENT
Start: 2025-06-09 | End: 2025-06-16

## 2025-06-09 RX ORDER — NAPROXEN 500 MG/1
500 TABLET ORAL
Qty: 20 TABLET | Refills: 0 | Status: SHIPPED | OUTPATIENT
Start: 2025-06-09 | End: 2025-06-19

## 2025-06-09 NOTE — PATIENT INSTRUCTIONS
You will need to follow up with occupational Health to be cleared to go back to work full duty.  You cannot return here for clearance.

## 2025-06-09 NOTE — PROGRESS NOTES
Subjective   Patient ID: Miguelina Gloria is a 39 y.o. adult. They present today with a chief complaint of Fall.    History of Present Illness  Patient presents with the complaint of right shoulder pain, right arm pain, left hip pain and bilateral knee pain. States she was at work today and she went to stand up in the bathroom and fell backwards. State she hit her shoulder and hip. States her knees hurt when she tried to stand back up. State she lost her footing when she fell back in the bathroom.     Past Medical History  Allergies as of 06/09/2025 - Reviewed 06/09/2025   Allergen Reaction Noted    Bee pollen Other 06/19/2023    Grass pollen Itching and Swelling 04/03/2025    Kiwi Unknown 06/19/2023    Pineapple Unknown 06/19/2023    Silver nitrate Hives, Itching, and Swelling 11/16/2023       Prescriptions Prior to Admission[1]     Medical History[2]    Surgical History[3]     reports that Miguelina has never smoked. Miguelina has never used smokeless tobacco. Miguelina reports that Miguelina does not drink alcohol and does not use drugs.    Review of Systems  Review of Systems     See HPI                          Objective    Vitals:    06/09/25 1614   BP: 137/88   Pulse: 96   Resp: 18   Temp: 36.8 °C (98.2 °F)   TempSrc: Oral   SpO2: 98%     No LMP recorded.    Physical Exam  Musculoskeletal:      Right shoulder: Tenderness present. No swelling or laceration. Normal range of motion. Normal strength. Normal pulse.      Left hip: Tenderness present. No deformity or lacerations. Normal range of motion.      Right knee: Normal.      Left knee: Normal.     Constitutional:       Appearance: Normal appearance.   Cardiovascular:      Rate and Rhythm: Normal rate and regular rhythm.      Pulses: Normal pulses.      Heart sounds: Normal heart sounds.   Pulmonary:      Effort: Pulmonary effort is normal.      Breath sounds: Normal breath sounds.     Procedures    Point of Care Test & Imaging Results from this visit  No results found  for this visit on 06/09/25.   Imaging  No results found.    Cardiology, Vascular, and Other Imaging  No other imaging results found for the past 2 days      Diagnostic study results (if any) were reviewed by ELEONORA Mcleod.    Assessment/Plan   Allergies, medications, history, and pertinent labs/EKGs/Imaging reviewed by ELEONORA Mcleod.     Medical Decision Making  Patient Instructions   You will need to follow up with occupational Health to be cleared to go back to work full duty.  You cannot return here for clearance.      No acute process noted.  Will be reviewed by radiology and will call patient if they disagree with findings.    At time of discharge patient was clinically well-appearing and HDS for outpatient management. The patient and/or family was educated regarding diagnosis, supportive care, OTC and Rx medications. The patient and/or family was given the opportunity to ask questions prior to discharge.  They verbalized understanding of my discussion of the plans for treatment, expected course, indications to return to  or seek further evaluation in ED, and the need for timely follow up as directed.   They were provided with a work/school excuse if requested.    Orders and Diagnoses  Diagnoses and all orders for this visit:  Acute pain of right shoulder  -     XR shoulder right 2+ views; Future  -     naproxen (Naprosyn) 500 mg tablet; Take 1 tablet (500 mg) by mouth 2 times daily (morning and late afternoon) for 10 days.  -     methylPREDNISolone (Medrol Dospak) 4 mg tablets; Follow schedule on package instructions  -     cyclobenzaprine (Flexeril) 10 mg tablet; Take 1 tablet (10 mg) by mouth 3 times a day for 7 days.  Left hip pain  -     XR hip left with pelvis when performed 2 or 3 views; Future  -     naproxen (Naprosyn) 500 mg tablet; Take 1 tablet (500 mg) by mouth 2 times daily (morning and late afternoon) for 10 days.  -     methylPREDNISolone (Medrol Dospak) 4 mg tablets;  Follow schedule on package instructions  -     cyclobenzaprine (Flexeril) 10 mg tablet; Take 1 tablet (10 mg) by mouth 3 times a day for 7 days.      Medical Admin Record      Patient disposition: Home    Electronically signed by ELEONORA Mcleod  4:35 PM           [1] (Not in a hospital admission)   [2]   Past Medical History:  Diagnosis Date    ADHD (attention deficit hyperactivity disorder) 2022    Anxiety 2023    Cluster headache 2018    Depression 2023    Difficulty walking 2018    Dysphagia 2020    Fibromyalgia, primary 2023    Headache, tension-type 2003    HL (hearing loss) 2021    Impacted cerumen, bilateral 10/26/2020    Bilateral impacted cerumen    Migraine, unspecified, not intractable, without status migrainosus     Migraines    Otalgia, bilateral 11/02/2020    Acute otalgia, bilateral    Other allergy status, other than to drugs and biological substances     History of environmental allergies    Other conditions influencing health status     Intentional weight loss    Personal history of diseases of the blood and blood-forming organs and certain disorders involving the immune mechanism     History of anemia    Personal history of other diseases of the digestive system 11/20/2020    History of gastroesophageal reflux (GERD)    Personal history of other diseases of the female genital tract 05/13/2019    History of vaginal discharge    Personal history of other diseases of the nervous system and sense organs     History of ear infections    Weakness of limb 2021   [3]   Past Surgical History:  Procedure Laterality Date    OTHER SURGICAL HISTORY  12/14/2020    No history of surgery

## 2025-06-16 DIAGNOSIS — R20.9 SENSORY DISTURBANCE: ICD-10-CM

## 2025-06-16 DIAGNOSIS — E67.8 EXCESSIVE VITAMIN B6 INTAKE: ICD-10-CM

## 2025-06-17 ENCOUNTER — SPECIALTY PHARMACY (OUTPATIENT)
Dept: PHARMACY | Facility: CLINIC | Age: 40
End: 2025-06-17

## 2025-06-23 DIAGNOSIS — M54.16 LUMBAR RADICULOPATHY: ICD-10-CM

## 2025-06-23 RX ORDER — MELOXICAM 15 MG/1
15 TABLET ORAL DAILY
Qty: 30 TABLET | Refills: 0 | Status: SHIPPED | OUTPATIENT
Start: 2025-06-23

## 2025-07-02 PROCEDURE — RXMED WILLOW AMBULATORY MEDICATION CHARGE

## 2025-07-03 ENCOUNTER — OFFICE VISIT (OUTPATIENT)
Dept: OTOLARYNGOLOGY | Facility: CLINIC | Age: 40
End: 2025-07-03
Payer: COMMERCIAL

## 2025-07-03 ENCOUNTER — PHARMACY VISIT (OUTPATIENT)
Dept: PHARMACY | Facility: CLINIC | Age: 40
End: 2025-07-03
Payer: MEDICARE

## 2025-07-03 VITALS
SYSTOLIC BLOOD PRESSURE: 142 MMHG | RESPIRATION RATE: 16 BRPM | OXYGEN SATURATION: 98 % | HEART RATE: 84 BPM | WEIGHT: 293 LBS | DIASTOLIC BLOOD PRESSURE: 87 MMHG | TEMPERATURE: 97.3 F | BODY MASS INDEX: 44.41 KG/M2 | HEIGHT: 68 IN

## 2025-07-03 DIAGNOSIS — R09.A2 GLOBUS SENSATION: ICD-10-CM

## 2025-07-03 DIAGNOSIS — J31.0 CHRONIC RHINITIS: Primary | ICD-10-CM

## 2025-07-03 DIAGNOSIS — R09.81 NASAL CONGESTION: ICD-10-CM

## 2025-07-03 PROCEDURE — 99214 OFFICE O/P EST MOD 30 MIN: CPT | Performed by: STUDENT IN AN ORGANIZED HEALTH CARE EDUCATION/TRAINING PROGRAM

## 2025-07-03 PROCEDURE — 3008F BODY MASS INDEX DOCD: CPT | Performed by: STUDENT IN AN ORGANIZED HEALTH CARE EDUCATION/TRAINING PROGRAM

## 2025-07-03 PROCEDURE — 1036F TOBACCO NON-USER: CPT | Performed by: STUDENT IN AN ORGANIZED HEALTH CARE EDUCATION/TRAINING PROGRAM

## 2025-07-03 RX ORDER — TRIAMCINOLONE ACETONIDE 55 UG/1
2 SPRAY, METERED NASAL DAILY
Qty: 16.5 G | Refills: 11 | Status: SHIPPED | OUTPATIENT
Start: 2025-07-03

## 2025-07-03 RX ORDER — AZELASTINE 1 MG/ML
1 SPRAY, METERED NASAL 2 TIMES DAILY
Qty: 30 ML | Refills: 11 | Status: SHIPPED | OUTPATIENT
Start: 2025-07-03

## 2025-07-03 SDOH — ECONOMIC STABILITY: FOOD INSECURITY: WITHIN THE PAST 12 MONTHS, YOU WORRIED THAT YOUR FOOD WOULD RUN OUT BEFORE YOU GOT MONEY TO BUY MORE.: NEVER TRUE

## 2025-07-03 SDOH — ECONOMIC STABILITY: FOOD INSECURITY: WITHIN THE PAST 12 MONTHS, THE FOOD YOU BOUGHT JUST DIDN'T LAST AND YOU DIDN'T HAVE MONEY TO GET MORE.: NEVER TRUE

## 2025-07-03 ASSESSMENT — LIFESTYLE VARIABLES
HOW MANY STANDARD DRINKS CONTAINING ALCOHOL DO YOU HAVE ON A TYPICAL DAY: PATIENT DOES NOT DRINK
SKIP TO QUESTIONS 9-10: 1
AUDIT-C TOTAL SCORE: 0
HOW OFTEN DO YOU HAVE A DRINK CONTAINING ALCOHOL: NEVER
HOW OFTEN DO YOU HAVE SIX OR MORE DRINKS ON ONE OCCASION: NEVER

## 2025-07-03 ASSESSMENT — PATIENT HEALTH QUESTIONNAIRE - PHQ9
1. LITTLE INTEREST OR PLEASURE IN DOING THINGS: NOT AT ALL
SUM OF ALL RESPONSES TO PHQ9 QUESTIONS 1 AND 2: 0
2. FEELING DOWN, DEPRESSED OR HOPELESS: NOT AT ALL

## 2025-07-03 ASSESSMENT — COLUMBIA-SUICIDE SEVERITY RATING SCALE - C-SSRS
2. HAVE YOU ACTUALLY HAD ANY THOUGHTS OF KILLING YOURSELF?: NO
1. IN THE PAST MONTH, HAVE YOU WISHED YOU WERE DEAD OR WISHED YOU COULD GO TO SLEEP AND NOT WAKE UP?: NO
6. HAVE YOU EVER DONE ANYTHING, STARTED TO DO ANYTHING, OR PREPARED TO DO ANYTHING TO END YOUR LIFE?: NO

## 2025-07-03 ASSESSMENT — ENCOUNTER SYMPTOMS
LOSS OF SENSATION IN FEET: 0
DEPRESSION: 1
OCCASIONAL FEELINGS OF UNSTEADINESS: 1

## 2025-07-03 ASSESSMENT — PAIN SCALES - GENERAL: PAINLEVEL_OUTOF10: 0-NO PAIN

## 2025-07-03 NOTE — PROGRESS NOTES
SUBJECTIVE  Patient ID: Miguelina Gloria is a 39 y.o. adult who presents for Follow-up (1 year follow up sinuses, check ears).    History: 34-year-old female referred by Dr. Cherelle Rico for evaluation of bilateral otalgia. Review of the chart the patient was seen in urgent care 10/26/2024 bilateral otalgia and impacted cerumen. The ear canals were irrigated and she was prescribed fluticasone nasal spray. She then saw her primary care doctor who started her on a course of omeprazole (11/20/2020). She has a previous upper endoscopy from 2017 which demonstrated a normal esophagus and Z line.     Patient reports that 10/26 she noted left aural fullness and right ear popping with swallowing. Despite use of fluticasone her left ear started popping as well as the right. Still having ear pain today. No preceding illnesses. No sick contacts. No history of ear surgery. No change in hearing, tinnitus, otorrhea. Rare dizziness; described as lightheadedness and imbalance. Does use a mouth guard nightly. Believes she grinds her teeth somewhat.     She reports that 11/20 she continued to have symptoms of acid reflux with coffee. Notes a sensation of water getting stuck in her throat. She reports that she has had some regurgitation of food and coffee in one case. Notes some issues with peanut butter as well as some other liquids. Is able to take her vitamin and allergy pills without issue. She had a previous GI work-up with upper endoscopy as noted above, but no definitive cause for persistent stomach pain was identified. She notes that she continues to have abdominal pains daily.     Update 3/13/2023: Now working full time at [employer]  Follow-up for chronic rhinitis and headache.  1. Reports that she will sometimes wake with globus sensation.  2. She notes that she finds that the combination of Nasacort/azelastine has been helpful for nasal congestion. No new concerns.  3. She reports persistent facial pressure although this  remains improved with current medications as prescribed by neurology. No significant exacerbations. Thinks that this may be influenced by wearing her several glasses.     Update 5/9/2024:    Follow-up for chronic rhinitis  Nasacort/azelastine nasal sprays are working well to control her Symptoms. She would prefer the combo.   No further issues to discuss today    Update 12/17/2024:  Here today for new issues. Getting work-up with neurology for POTS and possible peripheral neuropathies. Also recommended she have salivary gland biopsy to assess for possible Sjogren disease.  She does note history of dry mouth.    Update 12/31/2024:  Postop visit for wound check.  Reports that she did well following lower lip salivary gland biopsy but has had some numbness and tingling of the lower lip.  Pathology has resulted.    Update 7/3/2025:  Follow-up for chronic rhinitis. Feels like her combination nasal spray has been helpful for nasal congestion. Somewhat more congested very recently.  Has noted a history of throat irritation; had recent EGD with reported gastritis.  Interestingly had pain severe enough that she was seen in the emergency department 5/18/2025.  Follows with GI at [facility].    OBJECTIVE  Physical Exam  CONSTITUTIONAL: Well appearing female who appears stated age.  VOICE: Clear speech without hoarseness. No stridor nor stertor.  RESPIRATORY: Normal inspiration and expiration and chest wall expansion; no use of accessory muscles to breathe.  PSYCHIATRIC: Alert, appropriate mood and affect.   HEAD, FACE, AND SKIN: Symmetric facial feature.  EARS: Ear canals clear.  NOSE: On anterior rhinoscopy the nasal septum is midline. Mild ITH.  OROPHARYNX: Healthy appearing mucosa; moist. Moderate cobblestoning noted.    ASSESSMENT/PLAN  Diagnoses and all orders for this visit:  Chronic rhinitis  -     azelastine (Astelin) 137 mcg (0.1 %) nasal spray; Administer 1 spray into each nostril 2 times a day. Use in each nostril  as directed  -     triamcinolone (Nasacort) 55 mcg nasal inhaler; Administer 2 sprays into each nostril once daily.  Nasal congestion  -     azelastine (Astelin) 137 mcg (0.1 %) nasal spray; Administer 1 spray into each nostril 2 times a day. Use in each nostril as directed  -     triamcinolone (Nasacort) 55 mcg nasal inhaler; Administer 2 sprays into each nostril once daily.  Globus sensation      39 y.o. adult  seen for multiple issues.    1.  Chronic/allergic rhinitis, nasal congestion  Previously the patient had some mild nasal congestion and nasal dryness which has been well controlled with use of Fluticasone and Azelastine. In general she is happy with improvement in symptoms.  Initially attempted to switch her to a combination spray but Ryaltris was not approved; has continued Nasacort and azelastine with good success.  Discussed how to manage azelastine titration as needed depending on allergies.  Recommend she continue to use Nasacort consistently moving forward.  Refills provided.    2.  Globus sensation, burning throat, history of Gastritis  Patient also reports she has been dealing with more throat discomfort and general abdominal and chest discomfort in the setting of known acid reflux disease.  She has been working with gastroenterology at [facility] for treatment of gastritis.  Currently taking a daily PPI but still having symptoms.  On exam she does have moderate pharyngeal cobblestoning concerning for a combination of LPR/GERD.  We discussed potentially addition of Gaviscon Advance to see if this is helpful for her discomfort.  If she notes worsening throat or voice symptoms she should return for evaluation with flexible laryngoscopy.    Follow-up in one year.    [Outside GI note reviewed. Urgent care and ED notes reviewed. EGD not available for review.]     This note was created using speech recognition transcription software. Despite proofreading, typographical errors may be  present that affect the meaning of the content. Please contact my office with any questions.

## 2025-07-09 ENCOUNTER — HOSPITAL ENCOUNTER (OUTPATIENT)
Dept: RADIOLOGY | Facility: HOSPITAL | Age: 40
Discharge: HOME | End: 2025-07-09
Payer: COMMERCIAL

## 2025-07-09 ENCOUNTER — APPOINTMENT (OUTPATIENT)
Dept: PAIN MEDICINE | Facility: CLINIC | Age: 40
End: 2025-07-09
Payer: COMMERCIAL

## 2025-07-09 VITALS
SYSTOLIC BLOOD PRESSURE: 170 MMHG | HEART RATE: 83 BPM | RESPIRATION RATE: 18 BRPM | DIASTOLIC BLOOD PRESSURE: 91 MMHG | OXYGEN SATURATION: 98 % | WEIGHT: 293 LBS | TEMPERATURE: 97.2 F | BODY MASS INDEX: 44.41 KG/M2 | HEIGHT: 68 IN

## 2025-07-09 DIAGNOSIS — M25.552 BILATERAL HIP PAIN: ICD-10-CM

## 2025-07-09 DIAGNOSIS — M25.551 BILATERAL HIP PAIN: ICD-10-CM

## 2025-07-09 DIAGNOSIS — G89.29 CHRONIC PAIN OF BOTH KNEES: Primary | ICD-10-CM

## 2025-07-09 DIAGNOSIS — M25.561 CHRONIC PAIN OF BOTH KNEES: ICD-10-CM

## 2025-07-09 DIAGNOSIS — M54.2 CERVICALGIA: ICD-10-CM

## 2025-07-09 DIAGNOSIS — M25.562 CHRONIC PAIN OF BOTH KNEES: ICD-10-CM

## 2025-07-09 DIAGNOSIS — G89.29 CHRONIC PAIN OF BOTH KNEES: ICD-10-CM

## 2025-07-09 DIAGNOSIS — M25.561 CHRONIC PAIN OF BOTH KNEES: Primary | ICD-10-CM

## 2025-07-09 DIAGNOSIS — M25.562 CHRONIC PAIN OF BOTH KNEES: Primary | ICD-10-CM

## 2025-07-09 PROCEDURE — 99214 OFFICE O/P EST MOD 30 MIN: CPT | Performed by: ANESTHESIOLOGY

## 2025-07-09 PROCEDURE — 73562 X-RAY EXAM OF KNEE 3: CPT | Mod: 50

## 2025-07-09 PROCEDURE — 1036F TOBACCO NON-USER: CPT | Performed by: ANESTHESIOLOGY

## 2025-07-09 PROCEDURE — 3008F BODY MASS INDEX DOCD: CPT | Performed by: ANESTHESIOLOGY

## 2025-07-09 RX ORDER — PREGABALIN 75 MG/1
CAPSULE ORAL
Qty: 90 CAPSULE | Refills: 0 | Status: SHIPPED | OUTPATIENT
Start: 2025-07-09 | End: 2025-08-18

## 2025-07-09 ASSESSMENT — PAIN SCALES - GENERAL: PAINLEVEL_OUTOF10: 5

## 2025-07-09 ASSESSMENT — ENCOUNTER SYMPTOMS
SHORTNESS OF BREATH: 0
NECK PAIN: 1
LOSS OF SENSATION IN FEET: 1
ARTHRALGIAS: 1
OCCASIONAL FEELINGS OF UNSTEADINESS: 1
BACK PAIN: 1
BLOOD IN STOOL: 0
DEPRESSION: 0

## 2025-07-09 ASSESSMENT — PAIN DESCRIPTION - DESCRIPTORS: DESCRIPTORS: ACHING;RADIATING

## 2025-07-09 ASSESSMENT — PAIN - FUNCTIONAL ASSESSMENT: PAIN_FUNCTIONAL_ASSESSMENT: 0-10

## 2025-07-09 NOTE — PROGRESS NOTES
Chief Complain    Follow-up (FUV from [facility] on 5/30 with 0% relief. Pain is in b/l knees, left hip and upper mid back on right side,low back, b/l shoulders and right side of neck.  Currently taking tylenol at bedtime to help with pain.) and Med Management (No longer taking gabapentin, Celebrex , or mobic.)    History Of Present Illness  Miguelina Gloria is a 39 y.o. adult here for follow-up of neck, mid and low back pain and bilateral buttock pain.  The patient describes the pain as sharp, radiating. The patient's current pain score is 5 on a scale from 0-10. The pain is worsened by bending forward, standing, and walking and is alleviated by medications Tylenol, massage. Since the last visit the pain has been unchanged.    Past Medical History  Miguelina has a past medical history of ADHD (attention deficit hyperactivity disorder) (2022), Anxiety (2023), Chronic pain disorder (2011), Cluster headache (2018), Depression (2023), Difficulty walking (2018), Dysphagia (2020), Extremity pain (2022), Fibromyalgia, primary (2023), Headache, tension-type (2003), HL (hearing loss) (2021), Impacted cerumen, bilateral (10/26/2020), Joint pain (2011), Low back pain (11/2023), Migraine, unspecified, not intractable, without status migrainosus, Osteoarthritis (2018, 2024), Otalgia, bilateral (11/02/2020), Other allergy status, other than to drugs and biological substances, Other conditions influencing health status, Personal history of diseases of the blood and blood-forming organs and certain disorders involving the immune mechanism, Personal history of other diseases of the digestive system (11/20/2020), Personal history of other diseases of the female genital tract (05/13/2019), Personal history of other diseases of the nervous system and sense organs, TMJ dysfunction (2021), and Weakness of limb (2021).    Surgical History  Miguelina has a past surgical history that includes Other surgical history (12/14/2020).    Social  History  Miguelina reports that Miguelina has never smoked. Mgiuelina has never used smokeless tobacco. Miguelina reports that Miguelina does not drink alcohol and does not use drugs.    Family History  Family History   Problem Relation Name Age of Onset    Eczema Mother Emelyn Cerrato     GI problems Mother Emelyn Cerrato     Hypertension Mother Emelyn Cerrato     Hearing loss Mother Emelyn Cerrato     Stroke Father Richard     Lung cancer Father Richard     ADD / ADHD Father Richard     Cancer Father Richard     Migraines Father Richard     Other (environmental allergies) Other      Diabetes Other      Hypertension Other      Stroke Other Uncle     Heart disease Other Aunt         hole in heart    Heart disease Cousin          hole in herat    Dementia Maternal Grandmother Terrell     Stroke Maternal Grandmother Terrell     Arthritis Maternal Grandmother Terrell     Cancer Maternal Grandmother Terrell     Diabetes Maternal Grandmother Terrell     Dementia Mother's Brother Yang     Fibromyalgia Mother's Brother Yang     Stroke Mother's Brother Yang     Depression Sister Rachelle     Intellectual Disability Sister Rachelle     Dementia Mother's Brother Yang     Fibromyalgia Mother's Brother Yang     Stroke Mother's Brother Yang     Depression Sister Rachelle     Intellectual Disability Sister Rachelle     Dementia Mother's Brother Yang     Fibromyalgia Mother's Brother Yang     Stroke Mother's Brother Yang     Arthritis Mother's Brother Yang     Diabetes Mother's Brother Yang     Depression Sister Rachelle     Intellectual Disability Sister Rachelle     Autoimmune disease Sister Rachelle     Learning disabilities Sister Rachelle     Lupus Sister Rachelle     Diabetes Mother's Brother Steve     Cancer Father's Sister Zo     GI problems Mother's Sister Татьяна Wulu     Mental illness Mother's Brother Devin Peoples II         Allergies  Bee pollen, Grass pollen, Kiwi,  "Pineapple, and Silver nitrate    Review of Systems  Review of Systems   Respiratory:  Negative for shortness of breath.    Cardiovascular:  Negative for chest pain.   Gastrointestinal:  Negative for blood in stool.   Musculoskeletal:  Positive for arthralgias, back pain and neck pain.   Psychiatric/Behavioral:  Negative for suicidal ideas.         Physical Exam  Physical Exam  Constitutional:       Appearance: Normal appearance.   HENT:      Head: Normocephalic and atraumatic.   Eyes:      Extraocular Movements: Extraocular movements intact.      Pupils: Pupils are equal, round, and reactive to light.   Pulmonary:      Effort: Pulmonary effort is normal.   Musculoskeletal:      Cervical back: Neck supple.   Neurological:      Mental Status: Miguelina is alert and oriented to person, place, and time.   Psychiatric:         Mood and Affect: Mood normal.         Behavior: Behavior normal.           Last Recorded Vitals  Blood pressure (!) 170/91, pulse 83, temperature 36.2 °C (97.2 °F), resp. rate 18, height 1.727 m (5' 8\"), weight (!) 154 kg (340 lb), SpO2 98%.    Reviewed Images  XR SHOULDER RIGHT 2+ VIEWS;  6/9/2025 4:37 pm      INDICATION:  Signs/Symptoms:Injury.  ,M25.511 Pain in right shoulder      COMPARISON:  None.      ACCESSION NUMBER(S):  [id number]      ORDERING CLINICIAN:  JANES LEWIS      TECHNIQUE:  3 views  of the  right shoulder were obtained.      FINDINGS:  No significant osteophytic change. Space between the acromion and  humeral head was preserved. No lytic or blastic destructive bone  lesion. No acute fracture or dislocation.  No opaque soft tissue foreign body.  No periosteal reaction or erosion.  The imaged right hemithorax was clear.      IMPRESSION:  Negative exam.      MACRO:  None    XR HIP LEFT WITH PELVIS WHEN PERFORMED 2 OR 3 VIEWS;  6/9/2025 4:36 pm      INDICATION:  Signs/Symptoms:Injury.  ,M25.552 Pain in left hip      COMPARISON:  Prior exam from 03/14/2024      ACCESSION " NUMBER(S):  [id number]      ORDERING CLINICIAN:  JANES LEWIS      TECHNIQUE:  AP view pelvis and 2 views  of the  left hip were obtained.      FINDINGS:  AP view pelvis is severely limited by noise artifact.  Mild   sclerotic arthritic changes in both SI joints. Bilateral hip  joint spaces are preserved. No lytic or blastic destructive bone  lesion. No gross acute fracture or dislocation.  No opaque soft tissue foreign body.  No periosteal reaction or erosion.      IMPRESSION:  The exam is limited for the reasons stated.      No gross acute fracture or dislocation in this exam. If there is  still clinical concern for fracture, CT scan would then be  recommended.      MACRO:  None  Reviewed Labs   Latest Reference Range & Units 05/18/25 13:06   GLUCOSE 74 - 99 mg/dL 100 (H)   SODIUM 136 - 145 mmol/L 136   POTASSIUM 3.5 - 5.3 mmol/L 4.3   CHLORIDE 98 - 107 mmol/L 104   Bicarbonate 21 - 32 mmol/L 28   Anion Gap 10 - 20 mmol/L 8 (L)   Blood Urea Nitrogen 6 - 23 mg/dL 8   Creatinine 0.50 - 1.30 mg/dL 0.80   EGFR >60 mL/min/1.73m*2 >90   Calcium 8.6 - 10.3 mg/dL 8.7   Albumin 3.4 - 5.0 g/dL 3.5   Alkaline Phosphatase 33 - 120 U/L 74   ALT 7 - 52 U/L 24   AST 9 - 39 U/L 29   Bilirubin Total 0.0 - 1.2 mg/dL 0.4   (H): Data is abnormally high  (L): Data is abnormally low   Latest Reference Range & Units 05/18/25 13:06   WBC 4.4 - 11.3 x10*3/uL 6.9   nRBC 0.0 - 0.0 /100 WBCs 0.0   RBC 4.00 - 5.90 x10*6/uL 5.09   HEMOGLOBIN 12.0 - 17.5 g/dL 11.9 (L)   HEMATOCRIT 36.0 - 52.0 % 39.4   MCV 80 - 100 fL 77 (L)   MCH 26.0 - 34.0 pg 23.4 (L)   MCHC 32.0 - 36.0 g/dL 30.2 (L)   RED CELL DISTRIBUTION WIDTH 11.5 - 14.5 % 14.3   Platelets 150 - 450 x10*3/uL 325   Neutrophils % 40.0 - 80.0 % 54.2   Immature Granulocytes %, Automated 0.0 - 0.9 % 0.1   Lymphocytes % 13.0 - 44.0 % 30.7   Monocytes % 2.0 - 10.0 % 12.8   Eosinophils % 0.0 - 6.0 % 1.9   Basophils % 0.0 - 2.0 % 0.3   Neutrophils Absolute 1.20 - 7.70 x10*3/uL 3.73   Immature  Granulocytes Absolute, Automated 0.00 - 0.70 x10*3/uL 0.01   Lymphocytes Absolute 1.20 - 4.80 x10*3/uL 2.11   Monocytes Absolute 0.10 - 1.00 x10*3/uL 0.88   Eosinophils Absolute 0.00 - 0.70 x10*3/uL 0.13   (L): Data is abnormally low    Assessment/Plan   Encounter Diagnoses   Name Primary?    Chronic pain of both knees Yes    Bilateral hip pain     Cervicalgia             Miguelina Gloria is a 39 y.o. adult here for evaluation of widespread pain especially affecting her neck area, bilateral scapular area, mid back and low back pain bilateral hip joint and knee joint pain as well.  She has been experiencing the symptoms for years has been worse since 2023 after she had a fall.  Since then she has done 16 session of physical therapy which made her pain worse.Reviewed the MRI of her lumbar spine and thoracic spine which did not reveal any severe spinal canal or neural salcedo narrowing. MRI of her cervical spine which does reveal spinal canal narrowing at C4-5 with potential cord signal changes.  She has been seen by neurosurgery, they have offered her surgery for cervical spinal stenosis however she has been told it would not help her pain at this time.  I discussed closely watching for signs and symptoms of myelopathy and seeking urgent medical attention if she has worsening of the symptoms.  Last visit she had a cervical epidural steroid injection which did not provide her with any benefit.  Currently however she is most bothered by her bilateral hip pain, I would refer her to orthopedics for further evaluation and treatment.  Previously has failed treatment with gabapentin, Topamax, she is unable to tolerate NSAIDs.  I will trial her on Lyrica, risks benefits and alternatives were discussed.  I have personally reviewed the [facility] report.  I have considered the risks of abuse, dependence, addiction and diversion.      Sandeep Garcia MD

## 2025-07-16 ENCOUNTER — APPOINTMENT (OUTPATIENT)
Dept: PAIN MEDICINE | Facility: CLINIC | Age: 40
End: 2025-07-16
Payer: COMMERCIAL

## 2025-07-30 PROCEDURE — RXMED WILLOW AMBULATORY MEDICATION CHARGE

## 2025-07-31 ENCOUNTER — SPECIALTY PHARMACY (OUTPATIENT)
Dept: PHARMACY | Facility: CLINIC | Age: 40
End: 2025-07-31

## 2025-08-03 DIAGNOSIS — G43.119 INTRACTABLE MIGRAINE WITH AURA WITHOUT STATUS MIGRAINOSUS: ICD-10-CM

## 2025-08-04 RX ORDER — PROPRANOLOL HYDROCHLORIDE 20 MG/1
20 TABLET ORAL 3 TIMES DAILY
Qty: 270 TABLET | Refills: 1 | Status: SHIPPED | OUTPATIENT
Start: 2025-08-04

## 2025-08-05 ENCOUNTER — PHARMACY VISIT (OUTPATIENT)
Dept: PHARMACY | Facility: CLINIC | Age: 40
End: 2025-08-05
Payer: MEDICARE

## 2025-08-05 ENCOUNTER — APPOINTMENT (OUTPATIENT)
Dept: ORTHOPEDIC SURGERY | Facility: HOSPITAL | Age: 40
End: 2025-08-05
Payer: COMMERCIAL

## 2025-08-08 ENCOUNTER — OFFICE VISIT (OUTPATIENT)
Dept: ORTHOPEDIC SURGERY | Facility: HOSPITAL | Age: 40
End: 2025-08-08
Payer: COMMERCIAL

## 2025-08-08 DIAGNOSIS — M17.0 PRIMARY OSTEOARTHRITIS OF BOTH KNEES: Primary | ICD-10-CM

## 2025-08-08 DIAGNOSIS — M70.61 GREATER TROCHANTERIC BURSITIS OF BOTH HIPS: ICD-10-CM

## 2025-08-08 DIAGNOSIS — M70.62 GREATER TROCHANTERIC BURSITIS OF BOTH HIPS: ICD-10-CM

## 2025-08-08 PROCEDURE — 99204 OFFICE O/P NEW MOD 45 MIN: CPT

## 2025-08-08 PROCEDURE — 20610 DRAIN/INJ JOINT/BURSA W/O US: CPT | Mod: RT

## 2025-08-08 PROCEDURE — 2500000004 HC RX 250 GENERAL PHARMACY W/ HCPCS (ALT 636 FOR OP/ED)

## 2025-08-08 PROCEDURE — 99212 OFFICE O/P EST SF 10 MIN: CPT

## 2025-08-08 RX ORDER — LIDOCAINE HYDROCHLORIDE 10 MG/ML
4 INJECTION, SOLUTION INFILTRATION; PERINEURAL
Status: COMPLETED | OUTPATIENT
Start: 2025-08-08 | End: 2025-08-08

## 2025-08-08 RX ORDER — TRIAMCINOLONE ACETONIDE 40 MG/ML
40 INJECTION, SUSPENSION INTRA-ARTICULAR; INTRAMUSCULAR
Status: COMPLETED | OUTPATIENT
Start: 2025-08-08 | End: 2025-08-08

## 2025-08-08 RX ADMIN — TRIAMCINOLONE ACETONIDE 40 MG: 400 INJECTION, SUSPENSION INTRA-ARTICULAR; INTRAMUSCULAR at 14:58

## 2025-08-08 RX ADMIN — LIDOCAINE HYDROCHLORIDE 4 ML: 10 INJECTION, SOLUTION INFILTRATION; PERINEURAL at 14:58

## 2025-08-08 ASSESSMENT — ENCOUNTER SYMPTOMS
LOSS OF SENSATION IN FEET: 0
DEPRESSION: 0
OCCASIONAL FEELINGS OF UNSTEADINESS: 1

## 2025-08-08 NOTE — PROGRESS NOTES
NPV Referred by Dr. Sandeep Garcia MD Bilateral Knee/Hip Pain   XR Done 7/9/2025    Patient having ongoing pain for several years, patient has fallen a couple of times over the last year  due to instability.  Patient states the left hip Is worse then the right. Pain in the knee is similar on both knees.  Patient has tried a course of PT which was not helpful.

## 2025-08-08 NOTE — PROGRESS NOTES
PRIMARY CARE PHYSICIAN: Cherelle Rico MD  REFERRING PROVIDER: Sandeep Garcia MD  [address] [facility]  [place] [address]     ORTHOPAEDIC CONSULT: Knee and Hip Evaluation        ASSESSMENT & PLAN    IMPRESSION:   Bilateral knee osteoarthritis, bilateral greater trochanteric bursitis    PLAN:   -Discussion I discussed the diagnosis and treatment options with the patient today along with their associated risks and benefits. After thorough discussion, the patient has elected to proceed with a corticosteroid injection with Kenalog and Lidocaine, which was performed in the office today under aseptic technique and the patient tolerated the procedure well.  - Patient would like to trial a few weeks of topical Voltaren to bilateral knee since she is unable to tolerate oral anti-inflammatories.  -Will plan on following up in 1 month for reevaluation.  If her knee symptoms do not improve, may discussed bilateral corticosteroid injections at that time.  Patient ID: Miguelina Gloria is a 39 y.o. adult.    L Inj/Asp: R greater trochanteric bursa on 8/8/2025 2:58 PM  Indications: pain  Details: 22 G needle, lateral approach  Medications: 40 mg triamcinolone acetonide 40 mg/mL; 4 mL lidocaine 10 mg/mL (1 %)  Outcome: tolerated well, no immediate complications    Prepped with alcohol. Patient tolerated injection well. Band-aid applied to injection site.     Procedure, treatment alternatives, risks and benefits explained, specific risks discussed. Consent was given by the patient.       L Inj/Asp: R greater trochanteric bursa on 8/8/2025 2:58 PM  Indications: pain  Details: 22 G needle, lateral approach  Medications: 40 mg triamcinolone acetonide 40 mg/mL; 4 mL lidocaine 10 mg/mL (1 %)  Outcome: tolerated well, no immediate complications    Prepped with alcohol. Patient tolerated injection well. Band-aid applied to injection site.     Procedure, treatment alternatives, risks and benefits explained, specific risks  discussed. Consent was given by the patient.           SUBJECTIVE  CHIEF COMPLAINT:   Chief Complaint   Patient presents with    Right Knee - Pain    Left Knee - Pain        HPI: Miguelina Gloria is a 39 y.o. patient. Miguelina Gloria has had progressive problems with the bilateral hips and knees over the past 10 years.  Patient does have history of fibromyalgia, and follows with pain management.  They are unable to tolerate oral anti-inflammatories due to GI upset.  Currently the left hip is the most bothersome.  Hip pain is reproducible with palpation.  No numbness tingling or sensory changes in the lower extremities.  Patient has done multiple rounds of physical therapy for the knees and hips without resolution of symptoms.  Has never had any injections in the hips or knees.  Patient was just recently prescribed Lyrica by pain management, however is not started yet.      REVIEW OF SYSTEMS  Constitutional: See HPI for pain assessment, No significant weight loss, recent trauma  Cardiovascular: No chest pain, shortness of breath  Respiratory: No difficulty breathing, cough  Gastrointestinal: No nausea, vomiting, diarrhea, constipation  Musculoskeletal: Noted in HPI  Integumentary: No rashes, easy bruising, redness   Neurological: no numbness or tingling in extremities, no gait disturbances   Psychiatric: No mood changes, memory changes, social issues  Heme/Lymph: no excessive swelling, easy bruising, excessive bleeding  ENT: No hearing changes  Eyes: No vision changes    Medical History[1]     Allergies[2]     Surgical History[3]     Family History[4]     Social History     Socioeconomic History    Marital status:      Spouse name: Not on file    Number of children: Not on file    Years of education: Not on file    Highest education level: Not on file   Occupational History    Not on file   Tobacco Use    Smoking status: Never    Smokeless tobacco: Never   Vaping Use    Vaping status: Never Used   Substance and  "Sexual Activity    Alcohol use: Never    Drug use: Never    Sexual activity: Not on file   Other Topics Concern    Not on file   Social History Narrative    Not on file     Social Drivers of Health     Financial Resource Strain: Not on file   Food Insecurity: No Food Insecurity (7/3/2025)    Hunger Vital Sign     Worried About Running Out of Food in the Last Year: Never true     Ran Out of Food in the Last Year: Never true   Transportation Needs: Not on file   Physical Activity: Not on file   Stress: Not on file   Social Connections: Not on file   Intimate Partner Violence: Not on file   Housing Stability: Not on file        CURRENT MEDICATIONS:   Current Medications[5]     OBJECTIVE    PHYSICAL EXAM      5/18/2025     2:40 PM 5/18/2025     3:06 PM 5/30/2025     8:16 AM 5/30/2025     9:08 AM 6/9/2025     4:14 PM 7/3/2025     8:25 AM 7/9/2025     9:23 AM   Vitals   Systolic 131 127 131 109 137 142 170   Diastolic 70 82 71 67 88 87 91   BP Location   Left arm       Heart Rate 72 70 82 80 96 84 83   Temp   36.1 °C (97 °F)  36.8 °C (98.2 °F) 36.3 °C (97.3 °F) 36.2 °C (97.2 °F)   Resp 18 18 18 15 18 16 18   Height   1.727 m (5' 8\")   1.727 m (5' 8\") 1.727 m (5' 8\")   Weight (lb)   350   340 340   BMI   53.22 kg/m2   51.7 kg/m2 51.7 kg/m2   BSA (m2)   2.76 m2   2.72 m2 2.72 m2   Visit Report     Report Report Report      There is no height or weight on file to calculate BMI.    GENERAL: A/Ox3, NAD. Appears healthy, well nourished  PSYCHIATRIC: Mood stable, appropriate memory recall  EYES: EOM intact  CARDIAC: regular rate  LUNGS: Breathing non-labored  SKIN: no erythema, rashes, or ecchymoses   MUSCULOSKELETAL:  Laterality: bilateral Hip Exam  - ROM, Extension: full, no flexion contracture  - Strength: Abduction 5/5, Flexion 5/5. Abductor pain against resistance: Yes   - Palpation:  TTP along greater trochanter, posterolateral border  - Log roll/IR exam: non painful, good IR  - Straight leg raise: negative  - EHL/PF/DF " motor intact  - Gait: normal  Laterality: bilateral Knee Exam  - Alignment: partially correctible varus deformity  - ROM:  0-90   - Effusion: none  - Strength: knee extension and flexion 5/5, EHL/PF/DF motor intact  - Palpation: TTP along medial, lateral joint line  - Stability: Anterior/Posterior stable, varus/valgus stable  - Gait: normal  - Hip Exam: flexion to 100+ degrees, full extension, internal/external rotation adequate, and no pain with log roll  - Special Tests: none performed  NEUROVASCULAR:  - Neurovascular Status: sensation intact to light touch distally        IMAGING:  Multiple views of the affected bilateral knee(s) demonstrate: Mild tricompartmental degenerative changes present bilaterally with osteophytosis.  No joint effusion.  No fracture or dislocation.  No other abnormalities.   X-rays were personally reviewed and interpreted by me.  Radiology reports were reviewed by me as well, if readily available at the time.        Loretta Tamez PA-C  Physician Assistant  Orthopedic Surgery  [facility]         [1]   Past Medical History:  Diagnosis Date    ADHD (attention deficit hyperactivity disorder) 2022    Anxiety 2023    Chronic pain disorder 2011    Cluster headache 2018    Depression 2023    Difficulty walking 2018    Dysphagia 2020    Extremity pain 2022    Fibromyalgia, primary 2023    Headache, tension-type 2003    HL (hearing loss) 2021    Impacted cerumen, bilateral 10/26/2020    Bilateral impacted cerumen    Joint pain 2011    Low back pain 11/2023    Migraine, unspecified, not intractable, without status migrainosus     Migraines    Osteoarthritis 2018, 2024    Otalgia, bilateral 11/02/2020    Acute otalgia, bilateral    Other allergy status, other than to drugs and biological substances     History of environmental allergies    Other conditions influencing health status     Intentional weight loss    Personal history of diseases of the blood and  blood-forming organs and certain disorders involving the immune mechanism     History of anemia    Personal history of other diseases of the digestive system 11/20/2020    History of gastroesophageal reflux (GERD)    Personal history of other diseases of the female genital tract 05/13/2019    History of vaginal discharge    Personal history of other diseases of the nervous system and sense organs     History of ear infections    TMJ dysfunction 2021    Weakness of limb 2021   [2]   Allergies  Allergen Reactions    Bee Pollen Other    Grass Pollen Itching and Swelling    Kiwi Unknown    Pineapple Unknown    Silver Nitrate Hives, Itching and Swelling   [3]   Past Surgical History:  Procedure Laterality Date    OTHER SURGICAL HISTORY  12/14/2020    No history of surgery   [4]   Family History  Problem Relation Name Age of Onset    Eczema Mother Emelyn Cerrato     GI problems Mother Emelyn Cerrato     Hypertension Mother Emelyn Cerrato     Hearing loss Mother Emelyn Cerrato     Stroke Father Richard     Lung cancer Father Richard     ADD / ADHD Father Richard     Cancer Father Richard     Migraines Father Richard     Other (environmental allergies) Other      Diabetes Other      Hypertension Other      Stroke Other Uncle     Heart disease Other Aunt         hole in heart    Heart disease Cousin          hole in herat    Dementia Maternal Grandmother Terrell     Stroke Maternal Grandmother Terrell     Arthritis Maternal Grandmother Terrell     Cancer Maternal Grandmother Terrell     Diabetes Maternal Grandmother Terrell     Dementia Mother's Brother Yang     Fibromyalgia Mother's Brother Yang     Stroke Mother's Brother Yang     Depression Sister Rachelle     Intellectual Disability Sister Rachelle     Dementia Mother's Brother Yang     Fibromyalgia Mother's Brother Yang     Stroke Mother's Brother Yang     Depression Sister Rachelle     Intellectual Disability Sister  Rachelle     Dementia Mother's Brother Yang     Fibromyalgia Mother's Brother Yang     Stroke Mother's Brother Yang     Arthritis Mother's Brother Yang     Diabetes Mother's Brother Yang     Depression Sister Rachelle     Intellectual Disability Sister Rachelle     Autoimmune disease Sister Rachelle     Learning disabilities Sister Rachelle     Lupus Sister Rachelle     Diabetes Mother's Brother Steve     Cancer Father's Sister Zo     GI problems Mother's Sister Татьяна Bower     Mental illness Mother's Brother Devin Peoples II    [5]   Current Outpatient Medications   Medication Sig Dispense Refill    amitriptyline (Elavil) 10 mg tablet TAKE 1 TABLET BY MOUTH EVERYDAY AT BEDTIME 90 tablet 2    azelastine (Astelin) 137 mcg (0.1 %) nasal spray Administer 1 spray into each nostril 2 times a day. Use in each nostril as directed 30 mL 11    calcium carbonate-vitamin D3 600 mg-20 mcg (800 unit) tablet Take by mouth.      cyclobenzaprine (Flexeril) 10 mg tablet Take 1 tablet (10 mg) by mouth 3 times a day for 7 days. 21 tablet 0    DULoxetine (Cymbalta) 30 mg DR capsule Do not crush or chew.    Take 30 mg for 1 week before stopping your Cymbalta 7 days before your autonomic testing. Your testing is scheduled for January 28. Start the 30 mg on January 14 and stop the medication January 21. Restart your Cymbalta after testing to 30 mg for one week and then back to your 60 mg dose. 14 capsule 0    DULoxetine (Cymbalta) 60 mg DR capsule TAKE 1 CAPSULE BY MOUTH ONCE DAILY. 90 capsule 3    famotidine (Pepcid) 20 mg tablet Take 1 tablet (20 mg) by mouth 2 times a day as needed.      ferrous fumarate/ascorbic acid (FERROUS FUMARATE-VITAMIN C ORAL) Take 1 tablet by mouth once daily.      ferrous sulfate 325 (65 Fe) MG tablet Take 1 tablet by mouth 2 times a day.      fexofenadine-pseudoephedrine (Allegra-D 24 Hour) 180-240 mg 24 hr tablet Take 1 tablet by mouth once daily.      FIBER, CALCIUM POLYCARBOPHIL, ORAL Take 5 g by  mouth once daily.      galcanezumab (Emgality Pen) 120 mg/mL auto-injector INJECT 1 PEN UNDER THE SKIN 1 TIME A MONTH 3 each 3    ibuprofen 600 mg tablet Take by mouth 3 times a day as needed.      L.acid,ferm,nichelle,rha-B.bif,long (Controlled Delivery Probiotic) 126 mg (2 billion cell) tablet,delayed and ext.release Take 1 tablet by mouth once daily.      lactobacillus acidophilus capsule Take 1 capsule by mouth once daily. Duplicate. Patient only takes 1 Probiotic      Linzess 145 mcg capsule TAKE 1 CAPSULE BY MOUTH EVERY DAY 90 capsule 2    magnesium 250 mg tablet Magnesium 250 MG Oral Tablet  Refills: 0  Start: 19-Apr-2023      methylPREDNISolone (Medrol Dospak) 4 mg tablets Follow schedule on package instructions 21 tablet 0    multivit-minerals/folic acid (CENTRUM ADULTS ORAL) Take 1 tablet by mouth once daily.      multivit-mins no.11-folic acid (Dialyvite 5000) 5 mg tablet Take by mouth once daily. duplicate      pregabalin (Lyrica) 75 mg capsule Take 1 capsule (75 mg) by mouth once daily for 5 days, THEN 1 capsule (75 mg) 2 times a day for 5 days, THEN 1 capsule (75 mg) 3 times a day. 90 capsule 0    propranolol (Inderal) 20 mg tablet TAKE 1 TABLET (20 MG) BY MOUTH 3 TIMES A DAY. 270 tablet 1    rimegepant (NURTEC) 75 mg tablet,disintegrating Allow one (1) tablet to dissolve on or under the tongue once daily as needed for migraine. Do not take more than one (1) tablet in a 24 hour period. 16 tablet 5    triamcinolone (Kenalog) 0.1 % ointment Apply 1 Application topically 2 times a day.      triamcinolone (Nasacort) 55 mcg nasal inhaler Administer 2 sprays into each nostril once daily. 16.5 g 11     No current facility-administered medications for this visit.

## 2025-08-28 ENCOUNTER — SPECIALTY PHARMACY (OUTPATIENT)
Dept: PHARMACY | Facility: CLINIC | Age: 40
End: 2025-08-28

## 2025-08-28 PROCEDURE — RXMED WILLOW AMBULATORY MEDICATION CHARGE

## 2025-08-30 ENCOUNTER — PHARMACY VISIT (OUTPATIENT)
Dept: PHARMACY | Facility: CLINIC | Age: 40
End: 2025-08-30
Payer: MEDICARE

## 2025-09-09 ENCOUNTER — APPOINTMENT (OUTPATIENT)
Dept: ORTHOPEDIC SURGERY | Facility: HOSPITAL | Age: 40
End: 2025-09-09
Payer: COMMERCIAL

## 2025-09-22 ENCOUNTER — APPOINTMENT (OUTPATIENT)
Dept: PRIMARY CARE | Facility: CLINIC | Age: 40
End: 2025-09-22
Payer: COMMERCIAL